# Patient Record
Sex: FEMALE | Race: WHITE | HISPANIC OR LATINO | Employment: FULL TIME | URBAN - METROPOLITAN AREA
[De-identification: names, ages, dates, MRNs, and addresses within clinical notes are randomized per-mention and may not be internally consistent; named-entity substitution may affect disease eponyms.]

---

## 2017-01-06 ENCOUNTER — APPOINTMENT (OUTPATIENT)
Dept: PHYSICAL THERAPY | Facility: REHABILITATION | Age: 37
End: 2017-01-06
Payer: COMMERCIAL

## 2017-01-06 PROCEDURE — 97110 THERAPEUTIC EXERCISES: CPT

## 2017-01-06 PROCEDURE — 97162 PT EVAL MOD COMPLEX 30 MIN: CPT

## 2017-01-09 ENCOUNTER — APPOINTMENT (OUTPATIENT)
Dept: PHYSICAL THERAPY | Facility: REHABILITATION | Age: 37
End: 2017-01-09
Payer: COMMERCIAL

## 2017-01-09 PROCEDURE — 97140 MANUAL THERAPY 1/> REGIONS: CPT

## 2017-01-09 PROCEDURE — 97110 THERAPEUTIC EXERCISES: CPT

## 2017-01-10 ENCOUNTER — APPOINTMENT (OUTPATIENT)
Dept: PHYSICAL THERAPY | Facility: REHABILITATION | Age: 37
End: 2017-01-10
Payer: COMMERCIAL

## 2017-01-10 PROCEDURE — 97140 MANUAL THERAPY 1/> REGIONS: CPT

## 2017-01-10 PROCEDURE — 97110 THERAPEUTIC EXERCISES: CPT

## 2017-01-10 PROCEDURE — 97112 NEUROMUSCULAR REEDUCATION: CPT

## 2017-01-13 ENCOUNTER — APPOINTMENT (OUTPATIENT)
Dept: PHYSICAL THERAPY | Facility: REHABILITATION | Age: 37
End: 2017-01-13
Payer: COMMERCIAL

## 2017-01-13 PROCEDURE — 97110 THERAPEUTIC EXERCISES: CPT

## 2017-01-13 PROCEDURE — 97112 NEUROMUSCULAR REEDUCATION: CPT

## 2017-01-13 PROCEDURE — 97140 MANUAL THERAPY 1/> REGIONS: CPT

## 2017-01-16 ENCOUNTER — APPOINTMENT (OUTPATIENT)
Dept: PHYSICAL THERAPY | Facility: REHABILITATION | Age: 37
End: 2017-01-16
Payer: COMMERCIAL

## 2017-01-17 ENCOUNTER — APPOINTMENT (OUTPATIENT)
Dept: PHYSICAL THERAPY | Facility: REHABILITATION | Age: 37
End: 2017-01-17
Payer: COMMERCIAL

## 2017-01-18 ENCOUNTER — APPOINTMENT (OUTPATIENT)
Dept: PHYSICAL THERAPY | Facility: REHABILITATION | Age: 37
End: 2017-01-18
Payer: COMMERCIAL

## 2017-01-19 ENCOUNTER — APPOINTMENT (OUTPATIENT)
Dept: PHYSICAL THERAPY | Facility: REHABILITATION | Age: 37
End: 2017-01-19
Payer: COMMERCIAL

## 2017-01-19 PROCEDURE — 97110 THERAPEUTIC EXERCISES: CPT

## 2017-01-19 PROCEDURE — 97112 NEUROMUSCULAR REEDUCATION: CPT

## 2017-01-19 PROCEDURE — 97140 MANUAL THERAPY 1/> REGIONS: CPT

## 2017-01-23 ENCOUNTER — APPOINTMENT (OUTPATIENT)
Dept: PHYSICAL THERAPY | Facility: REHABILITATION | Age: 37
End: 2017-01-23
Payer: COMMERCIAL

## 2017-01-23 PROCEDURE — 97110 THERAPEUTIC EXERCISES: CPT

## 2017-01-23 PROCEDURE — 97140 MANUAL THERAPY 1/> REGIONS: CPT

## 2017-01-23 PROCEDURE — 97112 NEUROMUSCULAR REEDUCATION: CPT

## 2017-01-24 ENCOUNTER — APPOINTMENT (OUTPATIENT)
Dept: PHYSICAL THERAPY | Facility: REHABILITATION | Age: 37
End: 2017-01-24
Payer: COMMERCIAL

## 2017-01-24 PROCEDURE — 97110 THERAPEUTIC EXERCISES: CPT

## 2017-01-24 PROCEDURE — 97140 MANUAL THERAPY 1/> REGIONS: CPT

## 2017-01-24 PROCEDURE — 97112 NEUROMUSCULAR REEDUCATION: CPT

## 2017-01-26 ENCOUNTER — APPOINTMENT (OUTPATIENT)
Dept: PHYSICAL THERAPY | Facility: REHABILITATION | Age: 37
End: 2017-01-26
Payer: COMMERCIAL

## 2017-01-30 ENCOUNTER — APPOINTMENT (OUTPATIENT)
Dept: PHYSICAL THERAPY | Facility: REHABILITATION | Age: 37
End: 2017-01-30
Payer: COMMERCIAL

## 2017-01-31 ENCOUNTER — APPOINTMENT (OUTPATIENT)
Dept: PHYSICAL THERAPY | Facility: REHABILITATION | Age: 37
End: 2017-01-31
Payer: COMMERCIAL

## 2017-02-02 ENCOUNTER — APPOINTMENT (OUTPATIENT)
Dept: PHYSICAL THERAPY | Facility: REHABILITATION | Age: 37
End: 2017-02-02
Payer: COMMERCIAL

## 2017-02-07 ENCOUNTER — APPOINTMENT (OUTPATIENT)
Dept: PHYSICAL THERAPY | Facility: REHABILITATION | Age: 37
End: 2017-02-07
Payer: COMMERCIAL

## 2017-02-07 ENCOUNTER — GENERIC CONVERSION - ENCOUNTER (OUTPATIENT)
Dept: OTHER | Facility: OTHER | Age: 37
End: 2017-02-07

## 2017-02-07 PROCEDURE — 97110 THERAPEUTIC EXERCISES: CPT

## 2017-02-07 PROCEDURE — 97140 MANUAL THERAPY 1/> REGIONS: CPT

## 2017-02-07 PROCEDURE — 97112 NEUROMUSCULAR REEDUCATION: CPT

## 2017-02-08 ENCOUNTER — APPOINTMENT (OUTPATIENT)
Dept: PHYSICAL THERAPY | Facility: REHABILITATION | Age: 37
End: 2017-02-08
Payer: COMMERCIAL

## 2017-02-08 PROCEDURE — 97110 THERAPEUTIC EXERCISES: CPT

## 2017-02-08 PROCEDURE — 97112 NEUROMUSCULAR REEDUCATION: CPT

## 2017-02-08 PROCEDURE — 97140 MANUAL THERAPY 1/> REGIONS: CPT

## 2017-02-14 ENCOUNTER — APPOINTMENT (OUTPATIENT)
Dept: PHYSICAL THERAPY | Facility: REHABILITATION | Age: 37
End: 2017-02-14
Payer: COMMERCIAL

## 2017-02-14 PROCEDURE — 97112 NEUROMUSCULAR REEDUCATION: CPT

## 2017-02-14 PROCEDURE — 97110 THERAPEUTIC EXERCISES: CPT

## 2017-02-14 PROCEDURE — 97140 MANUAL THERAPY 1/> REGIONS: CPT

## 2017-02-15 ENCOUNTER — APPOINTMENT (OUTPATIENT)
Dept: PHYSICAL THERAPY | Facility: REHABILITATION | Age: 37
End: 2017-02-15
Payer: COMMERCIAL

## 2017-02-15 PROCEDURE — 97110 THERAPEUTIC EXERCISES: CPT

## 2017-02-15 PROCEDURE — 97112 NEUROMUSCULAR REEDUCATION: CPT

## 2017-02-15 PROCEDURE — 97140 MANUAL THERAPY 1/> REGIONS: CPT

## 2017-02-21 ENCOUNTER — APPOINTMENT (OUTPATIENT)
Dept: PHYSICAL THERAPY | Facility: REHABILITATION | Age: 37
End: 2017-02-21
Payer: COMMERCIAL

## 2017-02-21 PROCEDURE — 97112 NEUROMUSCULAR REEDUCATION: CPT

## 2017-02-21 PROCEDURE — 97110 THERAPEUTIC EXERCISES: CPT

## 2017-02-21 PROCEDURE — 97140 MANUAL THERAPY 1/> REGIONS: CPT

## 2017-02-22 ENCOUNTER — APPOINTMENT (OUTPATIENT)
Dept: PHYSICAL THERAPY | Facility: REHABILITATION | Age: 37
End: 2017-02-22
Payer: COMMERCIAL

## 2017-02-22 PROCEDURE — 97112 NEUROMUSCULAR REEDUCATION: CPT

## 2017-02-22 PROCEDURE — 97110 THERAPEUTIC EXERCISES: CPT

## 2017-02-22 PROCEDURE — 97140 MANUAL THERAPY 1/> REGIONS: CPT

## 2017-02-24 ENCOUNTER — APPOINTMENT (OUTPATIENT)
Dept: PHYSICAL THERAPY | Facility: REHABILITATION | Age: 37
End: 2017-02-24
Payer: COMMERCIAL

## 2017-02-27 ENCOUNTER — APPOINTMENT (OUTPATIENT)
Dept: PHYSICAL THERAPY | Facility: REHABILITATION | Age: 37
End: 2017-02-27
Payer: COMMERCIAL

## 2017-02-27 PROCEDURE — 97112 NEUROMUSCULAR REEDUCATION: CPT

## 2017-02-27 PROCEDURE — 97110 THERAPEUTIC EXERCISES: CPT

## 2017-02-27 PROCEDURE — 97140 MANUAL THERAPY 1/> REGIONS: CPT

## 2017-02-28 ENCOUNTER — APPOINTMENT (OUTPATIENT)
Dept: PHYSICAL THERAPY | Facility: REHABILITATION | Age: 37
End: 2017-02-28
Payer: COMMERCIAL

## 2017-02-28 PROCEDURE — 97110 THERAPEUTIC EXERCISES: CPT

## 2017-02-28 PROCEDURE — 97112 NEUROMUSCULAR REEDUCATION: CPT

## 2017-04-19 ENCOUNTER — GENERIC CONVERSION - ENCOUNTER (OUTPATIENT)
Dept: OTHER | Facility: OTHER | Age: 37
End: 2017-04-19

## 2017-04-24 DIAGNOSIS — M54.50 LOW BACK PAIN: ICD-10-CM

## 2017-04-24 DIAGNOSIS — M25.559 PAIN IN HIP: ICD-10-CM

## 2017-05-01 ENCOUNTER — HOSPITAL ENCOUNTER (OUTPATIENT)
Dept: RADIOLOGY | Facility: HOSPITAL | Age: 37
Discharge: HOME | End: 2017-05-01
Attending: ORTHOPAEDIC SURGERY
Payer: COMMERCIAL

## 2017-05-01 DIAGNOSIS — M54.9 BACKACHE: ICD-10-CM

## 2017-05-01 PROCEDURE — 72148 MRI LUMBAR SPINE W/O DYE: CPT

## 2017-05-15 ENCOUNTER — HOSPITAL ENCOUNTER (OUTPATIENT)
Dept: RADIOLOGY | Facility: HOSPITAL | Age: 37
Discharge: HOME | End: 2017-05-15
Attending: ORTHOPAEDIC SURGERY
Payer: COMMERCIAL

## 2017-05-15 ENCOUNTER — ALLSCRIPTS OFFICE VISIT (OUTPATIENT)
Dept: OTHER | Facility: OTHER | Age: 37
End: 2017-05-15

## 2017-05-15 DIAGNOSIS — M25.559 PAIN IN HIP: ICD-10-CM

## 2017-05-15 PROCEDURE — 73502 X-RAY EXAM HIP UNI 2-3 VIEWS: CPT

## 2017-05-30 ENCOUNTER — ALLSCRIPTS OFFICE VISIT (OUTPATIENT)
Dept: OTHER | Facility: OTHER | Age: 37
End: 2017-05-30

## 2017-06-19 ENCOUNTER — GENERIC CONVERSION - ENCOUNTER (OUTPATIENT)
Dept: OTHER | Facility: OTHER | Age: 37
End: 2017-06-19

## 2017-06-19 ENCOUNTER — APPOINTMENT (OUTPATIENT)
Dept: LAB | Facility: HOSPITAL | Age: 37
End: 2017-06-19
Attending: ORTHOPAEDIC SURGERY
Payer: COMMERCIAL

## 2017-06-19 ENCOUNTER — TRANSCRIBE ORDERS (OUTPATIENT)
Dept: LAB | Facility: HOSPITAL | Age: 37
End: 2017-06-19

## 2017-06-19 DIAGNOSIS — M16.32 OSTEOARTHRITIS RESULTING FROM LEFT HIP DYSPLASIA: ICD-10-CM

## 2017-06-19 DIAGNOSIS — M16.32 OSTEOARTHRITIS RESULTING FROM LEFT HIP DYSPLASIA: Primary | ICD-10-CM

## 2017-06-19 LAB
ABO GROUP BLD: NORMAL
ANION GAP SERPL CALCULATED.3IONS-SCNC: 7 MMOL/L (ref 4–13)
APTT PPP: 27 SECONDS (ref 23–35)
BASOPHILS # BLD AUTO: 0.03 THOUSANDS/ΜL (ref 0–0.1)
BASOPHILS NFR BLD AUTO: 1 % (ref 0–1)
BILIRUB UR QL STRIP: NEGATIVE
BLD GP AB SCN SERPL QL: NEGATIVE
BUN SERPL-MCNC: 13 MG/DL (ref 5–25)
CALCIUM SERPL-MCNC: 8.7 MG/DL (ref 8.3–10.1)
CHLORIDE SERPL-SCNC: 108 MMOL/L (ref 100–108)
CLARITY UR: CLEAR
CO2 SERPL-SCNC: 26 MMOL/L (ref 21–32)
COLOR UR: YELLOW
CREAT SERPL-MCNC: 0.55 MG/DL (ref 0.6–1.3)
EOSINOPHIL # BLD AUTO: 0.12 THOUSAND/ΜL (ref 0–0.61)
EOSINOPHIL NFR BLD AUTO: 2 % (ref 0–6)
ERYTHROCYTE [DISTWIDTH] IN BLOOD BY AUTOMATED COUNT: 13 % (ref 11.6–15.1)
EST. AVERAGE GLUCOSE BLD GHB EST-MCNC: 114 MG/DL
GFR SERPL CREATININE-BSD FRML MDRD: >60 ML/MIN/1.73SQ M
GLUCOSE P FAST SERPL-MCNC: 99 MG/DL (ref 65–99)
GLUCOSE UR STRIP-MCNC: NEGATIVE MG/DL
HBA1C MFR BLD: 5.6 % (ref 4.2–6.3)
HCT VFR BLD AUTO: 37.3 % (ref 34.8–46.1)
HGB BLD-MCNC: 12.5 G/DL (ref 11.5–15.4)
HGB UR QL STRIP.AUTO: NEGATIVE
INR PPP: 0.95 (ref 0.86–1.16)
KETONES UR STRIP-MCNC: NEGATIVE MG/DL
LEUKOCYTE ESTERASE UR QL STRIP: NEGATIVE
LYMPHOCYTES # BLD AUTO: 1.72 THOUSANDS/ΜL (ref 0.6–4.47)
LYMPHOCYTES NFR BLD AUTO: 31 % (ref 14–44)
MCH RBC QN AUTO: 29.8 PG (ref 26.8–34.3)
MCHC RBC AUTO-ENTMCNC: 33.5 G/DL (ref 31.4–37.4)
MCV RBC AUTO: 89 FL (ref 82–98)
MONOCYTES # BLD AUTO: 0.35 THOUSAND/ΜL (ref 0.17–1.22)
MONOCYTES NFR BLD AUTO: 6 % (ref 4–12)
NEUTROPHILS # BLD AUTO: 3.4 THOUSANDS/ΜL (ref 1.85–7.62)
NEUTS SEG NFR BLD AUTO: 60 % (ref 43–75)
NITRITE UR QL STRIP: NEGATIVE
NRBC BLD AUTO-RTO: 0 /100 WBCS
PH UR STRIP.AUTO: 6 [PH] (ref 4.5–8)
PLATELET # BLD AUTO: 268 THOUSANDS/UL (ref 149–390)
PMV BLD AUTO: 9.5 FL (ref 8.9–12.7)
POTASSIUM SERPL-SCNC: 4.2 MMOL/L (ref 3.5–5.3)
PROT UR STRIP-MCNC: NEGATIVE MG/DL
PROTHROMBIN TIME: 12.7 SECONDS (ref 12.1–14.4)
RBC # BLD AUTO: 4.19 MILLION/UL (ref 3.81–5.12)
RH BLD: NEGATIVE
SODIUM SERPL-SCNC: 141 MMOL/L (ref 136–145)
SP GR UR STRIP.AUTO: 1.03 (ref 1–1.03)
SPECIMEN EXPIRATION DATE: NORMAL
UROBILINOGEN UR QL STRIP.AUTO: 0.2 E.U./DL
WBC # BLD AUTO: 5.64 THOUSAND/UL (ref 4.31–10.16)

## 2017-06-19 PROCEDURE — 86850 RBC ANTIBODY SCREEN: CPT

## 2017-06-19 PROCEDURE — 86901 BLOOD TYPING SEROLOGIC RH(D): CPT

## 2017-06-19 PROCEDURE — 80048 BASIC METABOLIC PNL TOTAL CA: CPT

## 2017-06-19 PROCEDURE — 83036 HEMOGLOBIN GLYCOSYLATED A1C: CPT

## 2017-06-19 PROCEDURE — 85610 PROTHROMBIN TIME: CPT

## 2017-06-19 PROCEDURE — 85025 COMPLETE CBC W/AUTO DIFF WBC: CPT

## 2017-06-19 PROCEDURE — 36415 COLL VENOUS BLD VENIPUNCTURE: CPT

## 2017-06-19 PROCEDURE — 85730 THROMBOPLASTIN TIME PARTIAL: CPT

## 2017-06-19 PROCEDURE — 81003 URINALYSIS AUTO W/O SCOPE: CPT

## 2017-06-19 PROCEDURE — 86900 BLOOD TYPING SEROLOGIC ABO: CPT

## 2017-06-29 ENCOUNTER — APPOINTMENT (OUTPATIENT)
Dept: PREADMISSION TESTING | Facility: HOSPITAL | Age: 37
DRG: 470 | End: 2017-06-29
Payer: COMMERCIAL

## 2017-06-29 ENCOUNTER — HOSPITAL ENCOUNTER (OUTPATIENT)
Dept: RADIOLOGY | Facility: HOSPITAL | Age: 37
Discharge: HOME | End: 2017-06-29
Attending: ORTHOPAEDIC SURGERY
Payer: COMMERCIAL

## 2017-06-29 DIAGNOSIS — M16.32 OSTEOARTHRITIS RESULTING FROM LEFT HIP DYSPLASIA: ICD-10-CM

## 2017-06-29 PROCEDURE — 71020 HB CHEST X-RAY 2VW FRONTAL&LATL: CPT

## 2017-06-29 RX ORDER — ASCORBIC ACID 500 MG
500 TABLET ORAL
COMMUNITY

## 2017-06-29 RX ORDER — FOLIC ACID 1 MG/1
TABLET ORAL
COMMUNITY

## 2017-06-29 RX ORDER — FERROUS SULFATE 325(65) MG
325 TABLET ORAL
COMMUNITY

## 2017-07-05 ENCOUNTER — ANESTHESIA EVENT (OUTPATIENT)
Dept: PERIOP | Facility: HOSPITAL | Age: 37
DRG: 470 | End: 2017-07-05
Payer: COMMERCIAL

## 2017-07-10 ENCOUNTER — ANESTHESIA (OUTPATIENT)
Dept: PERIOP | Facility: HOSPITAL | Age: 37
DRG: 470 | End: 2017-07-10
Payer: COMMERCIAL

## 2017-07-10 ENCOUNTER — HOSPITAL ENCOUNTER (INPATIENT)
Facility: HOSPITAL | Age: 37
LOS: 2 days | Discharge: HOME HEALTH | DRG: 470 | End: 2017-07-12
Attending: ORTHOPAEDIC SURGERY | Admitting: ORTHOPAEDIC SURGERY
Payer: COMMERCIAL

## 2017-07-10 ENCOUNTER — APPOINTMENT (OUTPATIENT)
Dept: RADIOLOGY | Facility: HOSPITAL | Age: 37
DRG: 470 | End: 2017-07-10
Payer: COMMERCIAL

## 2017-07-10 DIAGNOSIS — Z96.642 STATUS POST TOTAL HIP REPLACEMENT, LEFT: Primary | ICD-10-CM

## 2017-07-10 PROBLEM — J45.909 ASTHMA: Status: ACTIVE | Noted: 2017-07-10

## 2017-07-10 LAB
ABO GROUP BLD: NORMAL
BLD GP AB SCN SERPL QL: NEGATIVE
EXT PREGNANCY TEST URINE: NEGATIVE
GLUCOSE SERPL-MCNC: 140 MG/DL (ref 65–140)
RH BLD: NEGATIVE
SPECIMEN EXPIRATION DATE: NORMAL

## 2017-07-10 PROCEDURE — 86850 RBC ANTIBODY SCREEN: CPT | Performed by: ORTHOPAEDIC SURGERY

## 2017-07-10 PROCEDURE — 97163 PT EVAL HIGH COMPLEX 45 MIN: CPT

## 2017-07-10 PROCEDURE — C1776 JOINT DEVICE (IMPLANTABLE): HCPCS | Performed by: ORTHOPAEDIC SURGERY

## 2017-07-10 PROCEDURE — 0SRB02Z REPLACEMENT OF LEFT HIP JOINT WITH METAL ON POLYETHYLENE SYNTHETIC SUBSTITUTE, OPEN APPROACH: ICD-10-PCS | Performed by: ORTHOPAEDIC SURGERY

## 2017-07-10 PROCEDURE — 86901 BLOOD TYPING SEROLOGIC RH(D): CPT | Performed by: ORTHOPAEDIC SURGERY

## 2017-07-10 PROCEDURE — 86900 BLOOD TYPING SEROLOGIC ABO: CPT | Performed by: ORTHOPAEDIC SURGERY

## 2017-07-10 PROCEDURE — G8978 MOBILITY CURRENT STATUS: HCPCS

## 2017-07-10 PROCEDURE — G8979 MOBILITY GOAL STATUS: HCPCS

## 2017-07-10 PROCEDURE — 81025 URINE PREGNANCY TEST: CPT | Performed by: ANESTHESIOLOGY

## 2017-07-10 PROCEDURE — 82948 REAGENT STRIP/BLOOD GLUCOSE: CPT

## 2017-07-10 PROCEDURE — 97166 OT EVAL MOD COMPLEX 45 MIN: CPT

## 2017-07-10 PROCEDURE — 73501 X-RAY EXAM HIP UNI 1 VIEW: CPT

## 2017-07-10 PROCEDURE — G8988 SELF CARE GOAL STATUS: HCPCS

## 2017-07-10 PROCEDURE — C1713 ANCHOR/SCREW BN/BN,TIS/BN: HCPCS | Performed by: ORTHOPAEDIC SURGERY

## 2017-07-10 PROCEDURE — G8987 SELF CARE CURRENT STATUS: HCPCS

## 2017-07-10 DEVICE — BIOLOX DELTA CERAMIC FEMORAL HEAD 32MM DIA +1 12/14 TAPER
Type: IMPLANTABLE DEVICE | Site: HIP | Status: FUNCTIONAL
Brand: BIOLOX DELTA

## 2017-07-10 DEVICE — PINNACLE POROCOAT ACETABULAR SHELL SECTOR II 48MM OD
Type: IMPLANTABLE DEVICE | Site: HIP | Status: FUNCTIONAL
Brand: PINNACLE POROCOAT

## 2017-07-10 DEVICE — CORAIL HIP SYSTEM CEMENTLESS FEMORAL STEM 12/14 AMT 135 DEGREES KA SIZE 12 HA COATED STANDARD COLLAR
Type: IMPLANTABLE DEVICE | Site: HIP | Status: FUNCTIONAL
Brand: CORAIL

## 2017-07-10 DEVICE — PINNACLE HIP SOLUTIONS ALTRX POLYETHYLENE ACETABULAR LINER NEUTRAL 32MM ID 48MM OD
Type: IMPLANTABLE DEVICE | Site: HIP | Status: FUNCTIONAL
Brand: PINNACLE ALTRX

## 2017-07-10 DEVICE — PINNACLE CANCELLOUS BONE SCREW 6.5MM X 25MM
Type: IMPLANTABLE DEVICE | Site: HIP | Status: FUNCTIONAL
Brand: PINNACLE

## 2017-07-10 RX ORDER — MAGNESIUM HYDROXIDE 1200 MG/15ML
LIQUID ORAL AS NEEDED
Status: DISCONTINUED | OUTPATIENT
Start: 2017-07-10 | End: 2017-07-10 | Stop reason: HOSPADM

## 2017-07-10 RX ORDER — MORPHINE SULFATE 2 MG/ML
2 INJECTION, SOLUTION INTRAMUSCULAR; INTRAVENOUS
Status: DISCONTINUED | OUTPATIENT
Start: 2017-07-10 | End: 2017-07-12 | Stop reason: HOSPADM

## 2017-07-10 RX ORDER — GLYCOPYRROLATE 0.2 MG/ML
INJECTION INTRAMUSCULAR; INTRAVENOUS AS NEEDED
Status: DISCONTINUED | OUTPATIENT
Start: 2017-07-10 | End: 2017-07-10 | Stop reason: SURG

## 2017-07-10 RX ORDER — ONDANSETRON 2 MG/ML
INJECTION INTRAMUSCULAR; INTRAVENOUS AS NEEDED
Status: DISCONTINUED | OUTPATIENT
Start: 2017-07-10 | End: 2017-07-10 | Stop reason: SURG

## 2017-07-10 RX ORDER — SODIUM CHLORIDE, SODIUM LACTATE, POTASSIUM CHLORIDE, CALCIUM CHLORIDE 600; 310; 30; 20 MG/100ML; MG/100ML; MG/100ML; MG/100ML
100 INJECTION, SOLUTION INTRAVENOUS CONTINUOUS
Status: DISPENSED | OUTPATIENT
Start: 2017-07-10 | End: 2017-07-11

## 2017-07-10 RX ORDER — OXYCODONE HYDROCHLORIDE 5 MG/1
TABLET ORAL
Qty: 30 TABLET | Refills: 0 | Status: SHIPPED | OUTPATIENT
Start: 2017-07-10

## 2017-07-10 RX ORDER — FENTANYL CITRATE/PF 50 MCG/ML
50 SYRINGE (ML) INJECTION
Status: DISCONTINUED | OUTPATIENT
Start: 2017-07-10 | End: 2017-07-10 | Stop reason: HOSPADM

## 2017-07-10 RX ORDER — ROCURONIUM BROMIDE 10 MG/ML
INJECTION, SOLUTION INTRAVENOUS AS NEEDED
Status: DISCONTINUED | OUTPATIENT
Start: 2017-07-10 | End: 2017-07-10 | Stop reason: SURG

## 2017-07-10 RX ORDER — METOCLOPRAMIDE HYDROCHLORIDE 5 MG/ML
10 INJECTION INTRAMUSCULAR; INTRAVENOUS ONCE AS NEEDED
Status: DISCONTINUED | OUTPATIENT
Start: 2017-07-10 | End: 2017-07-10 | Stop reason: HOSPADM

## 2017-07-10 RX ORDER — PROPOFOL 10 MG/ML
INJECTION, EMULSION INTRAVENOUS AS NEEDED
Status: DISCONTINUED | OUTPATIENT
Start: 2017-07-10 | End: 2017-07-10 | Stop reason: SURG

## 2017-07-10 RX ORDER — ACETAMINOPHEN 325 MG/1
975 TABLET ORAL ONCE
Status: DISCONTINUED | OUTPATIENT
Start: 2017-07-10 | End: 2017-07-10

## 2017-07-10 RX ORDER — GABAPENTIN 300 MG/1
300 CAPSULE ORAL ONCE
Status: DISCONTINUED | OUTPATIENT
Start: 2017-07-10 | End: 2017-07-10

## 2017-07-10 RX ORDER — CALCIUM CARBONATE 200(500)MG
1000 TABLET,CHEWABLE ORAL DAILY PRN
Status: DISCONTINUED | OUTPATIENT
Start: 2017-07-10 | End: 2017-07-12 | Stop reason: HOSPADM

## 2017-07-10 RX ORDER — ASCORBIC ACID 500 MG
500 TABLET ORAL DAILY
Status: DISCONTINUED | OUTPATIENT
Start: 2017-07-10 | End: 2017-07-12 | Stop reason: HOSPADM

## 2017-07-10 RX ORDER — SENNOSIDES 8.6 MG
650 CAPSULE ORAL EVERY 8 HOURS PRN
Qty: 30 TABLET | Refills: 0 | Status: SHIPPED | OUTPATIENT
Start: 2017-07-10 | End: 2017-08-09

## 2017-07-10 RX ORDER — OXYCODONE HYDROCHLORIDE 10 MG/1
10 TABLET ORAL EVERY 4 HOURS PRN
Status: DISCONTINUED | OUTPATIENT
Start: 2017-07-10 | End: 2017-07-12 | Stop reason: HOSPADM

## 2017-07-10 RX ORDER — LIDOCAINE HYDROCHLORIDE AND EPINEPHRINE 10; 10 MG/ML; UG/ML
INJECTION, SOLUTION INFILTRATION; PERINEURAL AS NEEDED
Status: DISCONTINUED | OUTPATIENT
Start: 2017-07-10 | End: 2017-07-10 | Stop reason: HOSPADM

## 2017-07-10 RX ORDER — ACETAMINOPHEN 325 MG/1
650 TABLET ORAL EVERY 6 HOURS PRN
Status: DISCONTINUED | OUTPATIENT
Start: 2017-07-10 | End: 2017-07-12 | Stop reason: HOSPADM

## 2017-07-10 RX ORDER — TRAMADOL HYDROCHLORIDE 50 MG/1
50 TABLET ORAL EVERY 6 HOURS SCHEDULED
Status: DISCONTINUED | OUTPATIENT
Start: 2017-07-10 | End: 2017-07-12 | Stop reason: HOSPADM

## 2017-07-10 RX ORDER — SODIUM CHLORIDE, SODIUM LACTATE, POTASSIUM CHLORIDE, CALCIUM CHLORIDE 600; 310; 30; 20 MG/100ML; MG/100ML; MG/100ML; MG/100ML
125 INJECTION, SOLUTION INTRAVENOUS CONTINUOUS
Status: DISCONTINUED | OUTPATIENT
Start: 2017-07-10 | End: 2017-07-10

## 2017-07-10 RX ORDER — TRAMADOL HYDROCHLORIDE 50 MG/1
50 TABLET ORAL EVERY 6 HOURS PRN
Qty: 30 TABLET | Refills: 0 | Status: SHIPPED | OUTPATIENT
Start: 2017-07-10 | End: 2017-07-20

## 2017-07-10 RX ORDER — MEPERIDINE HYDROCHLORIDE 25 MG/ML
12.5 INJECTION INTRAMUSCULAR; INTRAVENOUS; SUBCUTANEOUS ONCE AS NEEDED
Status: DISCONTINUED | OUTPATIENT
Start: 2017-07-10 | End: 2017-07-10 | Stop reason: HOSPADM

## 2017-07-10 RX ORDER — ONDANSETRON 2 MG/ML
4 INJECTION INTRAMUSCULAR; INTRAVENOUS EVERY 6 HOURS PRN
Status: DISCONTINUED | OUTPATIENT
Start: 2017-07-10 | End: 2017-07-12 | Stop reason: HOSPADM

## 2017-07-10 RX ORDER — FERROUS SULFATE 325(65) MG
325 TABLET ORAL 2 TIMES DAILY WITH MEALS
Status: DISCONTINUED | OUTPATIENT
Start: 2017-07-10 | End: 2017-07-12 | Stop reason: HOSPADM

## 2017-07-10 RX ORDER — ALBUTEROL SULFATE 90 UG/1
2 AEROSOL, METERED RESPIRATORY (INHALATION) EVERY 6 HOURS PRN
Status: DISCONTINUED | OUTPATIENT
Start: 2017-07-10 | End: 2017-07-12 | Stop reason: HOSPADM

## 2017-07-10 RX ORDER — LIDOCAINE HYDROCHLORIDE 10 MG/ML
INJECTION, SOLUTION EPIDURAL; INFILTRATION; INTRACAUDAL; PERINEURAL AS NEEDED
Status: DISCONTINUED | OUTPATIENT
Start: 2017-07-10 | End: 2017-07-10 | Stop reason: SURG

## 2017-07-10 RX ORDER — FENTANYL CITRATE 50 UG/ML
INJECTION, SOLUTION INTRAMUSCULAR; INTRAVENOUS AS NEEDED
Status: DISCONTINUED | OUTPATIENT
Start: 2017-07-10 | End: 2017-07-10 | Stop reason: SURG

## 2017-07-10 RX ORDER — OXYCODONE HYDROCHLORIDE 5 MG/1
5 TABLET ORAL EVERY 4 HOURS PRN
Status: DISCONTINUED | OUTPATIENT
Start: 2017-07-10 | End: 2017-07-12 | Stop reason: HOSPADM

## 2017-07-10 RX ORDER — MAGNESIUM HYDROXIDE/ALUMINUM HYDROXICE/SIMETHICONE 120; 1200; 1200 MG/30ML; MG/30ML; MG/30ML
30 SUSPENSION ORAL EVERY 6 HOURS PRN
Status: DISCONTINUED | OUTPATIENT
Start: 2017-07-10 | End: 2017-07-12 | Stop reason: HOSPADM

## 2017-07-10 RX ORDER — SENNOSIDES 8.6 MG
1 TABLET ORAL DAILY
Status: DISCONTINUED | OUTPATIENT
Start: 2017-07-10 | End: 2017-07-12 | Stop reason: HOSPADM

## 2017-07-10 RX ORDER — DOCUSATE SODIUM 100 MG/1
100 CAPSULE, LIQUID FILLED ORAL 2 TIMES DAILY
Status: DISCONTINUED | OUTPATIENT
Start: 2017-07-10 | End: 2017-07-12 | Stop reason: HOSPADM

## 2017-07-10 RX ORDER — MIDAZOLAM HYDROCHLORIDE 1 MG/ML
INJECTION INTRAMUSCULAR; INTRAVENOUS AS NEEDED
Status: DISCONTINUED | OUTPATIENT
Start: 2017-07-10 | End: 2017-07-10 | Stop reason: SURG

## 2017-07-10 RX ORDER — ONDANSETRON 2 MG/ML
4 INJECTION INTRAMUSCULAR; INTRAVENOUS ONCE AS NEEDED
Status: DISCONTINUED | OUTPATIENT
Start: 2017-07-10 | End: 2017-07-10 | Stop reason: HOSPADM

## 2017-07-10 RX ORDER — ACETAMINOPHEN 325 MG/1
975 TABLET ORAL ONCE
Status: COMPLETED | OUTPATIENT
Start: 2017-07-10 | End: 2017-07-10

## 2017-07-10 RX ADMIN — MORPHINE SULFATE 2 MG: 2 INJECTION, SOLUTION INTRAMUSCULAR; INTRAVENOUS at 15:10

## 2017-07-10 RX ADMIN — NEOSTIGMINE METHYLSULFATE 3 MG: 1 INJECTION, SOLUTION INTRAMUSCULAR; INTRAVENOUS; SUBCUTANEOUS at 12:40

## 2017-07-10 RX ADMIN — HYDROMORPHONE HYDROCHLORIDE 0.2 MG: 1 INJECTION, SOLUTION INTRAMUSCULAR; INTRAVENOUS; SUBCUTANEOUS at 13:24

## 2017-07-10 RX ADMIN — CEFAZOLIN SODIUM 2000 MG: 2 SOLUTION INTRAVENOUS at 18:17

## 2017-07-10 RX ADMIN — DOCUSATE SODIUM 100 MG: 100 CAPSULE, LIQUID FILLED ORAL at 18:14

## 2017-07-10 RX ADMIN — ROCURONIUM BROMIDE 50 MG: 10 INJECTION, SOLUTION INTRAVENOUS at 10:39

## 2017-07-10 RX ADMIN — GLYCOPYRROLATE 0.1 MG: 0.2 INJECTION, SOLUTION INTRAMUSCULAR; INTRAVENOUS at 10:35

## 2017-07-10 RX ADMIN — HYDROMORPHONE HYDROCHLORIDE 0.5 MG: 1 INJECTION, SOLUTION INTRAMUSCULAR; INTRAVENOUS; SUBCUTANEOUS at 12:04

## 2017-07-10 RX ADMIN — HYDROMORPHONE HYDROCHLORIDE 0.2 MG: 1 INJECTION, SOLUTION INTRAMUSCULAR; INTRAVENOUS; SUBCUTANEOUS at 13:49

## 2017-07-10 RX ADMIN — SENNOSIDES 8.6 MG: 8.6 TABLET, FILM COATED ORAL at 16:18

## 2017-07-10 RX ADMIN — ROCURONIUM BROMIDE 20 MG: 10 INJECTION, SOLUTION INTRAVENOUS at 11:15

## 2017-07-10 RX ADMIN — ONDANSETRON 4 MG: 2 INJECTION INTRAMUSCULAR; INTRAVENOUS at 12:34

## 2017-07-10 RX ADMIN — ROCURONIUM BROMIDE 10 MG: 10 INJECTION, SOLUTION INTRAVENOUS at 12:16

## 2017-07-10 RX ADMIN — HYDROMORPHONE HYDROCHLORIDE 0.5 MG: 1 INJECTION, SOLUTION INTRAMUSCULAR; INTRAVENOUS; SUBCUTANEOUS at 11:13

## 2017-07-10 RX ADMIN — CEFAZOLIN SODIUM 2000 MG: 2 SOLUTION INTRAVENOUS at 10:44

## 2017-07-10 RX ADMIN — DEXAMETHASONE SODIUM PHOSPHATE 5 MG: 10 INJECTION INTRAMUSCULAR; INTRAVENOUS at 10:51

## 2017-07-10 RX ADMIN — TRANEXAMIC ACID 1500 MG: 100 INJECTION, SOLUTION INTRAVENOUS at 10:49

## 2017-07-10 RX ADMIN — ACETAMINOPHEN 975 MG: 325 TABLET, FILM COATED ORAL at 09:21

## 2017-07-10 RX ADMIN — HYDROMORPHONE HYDROCHLORIDE 0.5 MG: 1 INJECTION, SOLUTION INTRAMUSCULAR; INTRAVENOUS; SUBCUTANEOUS at 11:48

## 2017-07-10 RX ADMIN — LIDOCAINE HYDROCHLORIDE 20 MG: 10 INJECTION, SOLUTION EPIDURAL; INFILTRATION; INTRACAUDAL; PERINEURAL at 10:39

## 2017-07-10 RX ADMIN — HYDROMORPHONE HYDROCHLORIDE 0.2 MG: 1 INJECTION, SOLUTION INTRAMUSCULAR; INTRAVENOUS; SUBCUTANEOUS at 13:32

## 2017-07-10 RX ADMIN — Medication 500 MG: at 16:18

## 2017-07-10 RX ADMIN — OXYCODONE HYDROCHLORIDE 10 MG: 10 TABLET ORAL at 16:18

## 2017-07-10 RX ADMIN — HYDROMORPHONE HYDROCHLORIDE 0.5 MG: 1 INJECTION, SOLUTION INTRAMUSCULAR; INTRAVENOUS; SUBCUTANEOUS at 12:43

## 2017-07-10 RX ADMIN — FENTANYL CITRATE 50 MCG: 50 INJECTION, SOLUTION INTRAMUSCULAR; INTRAVENOUS at 10:39

## 2017-07-10 RX ADMIN — TRAMADOL HYDROCHLORIDE 50 MG: 50 TABLET, COATED ORAL at 23:37

## 2017-07-10 RX ADMIN — SODIUM CHLORIDE, SODIUM LACTATE, POTASSIUM CHLORIDE, AND CALCIUM CHLORIDE 100 ML/HR: .6; .31; .03; .02 INJECTION, SOLUTION INTRAVENOUS at 17:45

## 2017-07-10 RX ADMIN — SODIUM CHLORIDE, SODIUM LACTATE, POTASSIUM CHLORIDE, AND CALCIUM CHLORIDE 100 ML/HR: .6; .31; .03; .02 INJECTION, SOLUTION INTRAVENOUS at 15:09

## 2017-07-10 RX ADMIN — SODIUM CHLORIDE, SODIUM LACTATE, POTASSIUM CHLORIDE, AND CALCIUM CHLORIDE: .6; .31; .03; .02 INJECTION, SOLUTION INTRAVENOUS at 12:05

## 2017-07-10 RX ADMIN — ROCURONIUM BROMIDE 20 MG: 10 INJECTION, SOLUTION INTRAVENOUS at 11:44

## 2017-07-10 RX ADMIN — PROPOFOL 200 MG: 10 INJECTION, EMULSION INTRAVENOUS at 10:39

## 2017-07-10 RX ADMIN — MIDAZOLAM HYDROCHLORIDE 2 MG: 1 INJECTION, SOLUTION INTRAMUSCULAR; INTRAVENOUS at 10:35

## 2017-07-10 RX ADMIN — SODIUM CHLORIDE, SODIUM LACTATE, POTASSIUM CHLORIDE, AND CALCIUM CHLORIDE 125 ML/HR: .6; .31; .03; .02 INJECTION, SOLUTION INTRAVENOUS at 10:06

## 2017-07-10 RX ADMIN — OXYCODONE HYDROCHLORIDE 10 MG: 10 TABLET ORAL at 21:27

## 2017-07-10 RX ADMIN — FENTANYL CITRATE 50 MCG: 50 INJECTION, SOLUTION INTRAMUSCULAR; INTRAVENOUS at 11:13

## 2017-07-10 RX ADMIN — GLYCOPYRROLATE 0.4 MG: 0.2 INJECTION, SOLUTION INTRAMUSCULAR; INTRAVENOUS at 12:40

## 2017-07-10 RX ADMIN — ONDANSETRON 4 MG: 2 INJECTION INTRAMUSCULAR; INTRAVENOUS at 16:19

## 2017-07-10 RX ADMIN — TRAMADOL HYDROCHLORIDE 50 MG: 50 TABLET, COATED ORAL at 18:14

## 2017-07-10 RX ADMIN — Medication 325 MG: at 16:19

## 2017-07-10 RX ADMIN — SODIUM CHLORIDE, SODIUM LACTATE, POTASSIUM CHLORIDE, AND CALCIUM CHLORIDE: .6; .31; .03; .02 INJECTION, SOLUTION INTRAVENOUS at 12:00

## 2017-07-11 ENCOUNTER — APPOINTMENT (INPATIENT)
Dept: OCCUPATIONAL THERAPY | Facility: HOSPITAL | Age: 37
DRG: 470 | End: 2017-07-11
Payer: COMMERCIAL

## 2017-07-11 LAB
ERYTHROCYTE [DISTWIDTH] IN BLOOD BY AUTOMATED COUNT: 12.9 % (ref 11.6–15.1)
HCT VFR BLD AUTO: 31.9 % (ref 34.8–46.1)
HGB BLD-MCNC: 10.7 G/DL (ref 11.5–15.4)
MCH RBC QN AUTO: 29.7 PG (ref 26.8–34.3)
MCHC RBC AUTO-ENTMCNC: 33.5 G/DL (ref 31.4–37.4)
MCV RBC AUTO: 89 FL (ref 82–98)
PLATELET # BLD AUTO: 238 THOUSANDS/UL (ref 149–390)
PMV BLD AUTO: 9.6 FL (ref 8.9–12.7)
RBC # BLD AUTO: 3.6 MILLION/UL (ref 3.81–5.12)
WBC # BLD AUTO: 9.96 THOUSAND/UL (ref 4.31–10.16)

## 2017-07-11 PROCEDURE — 97535 SELF CARE MNGMENT TRAINING: CPT

## 2017-07-11 PROCEDURE — G8989 SELF CARE D/C STATUS: HCPCS

## 2017-07-11 PROCEDURE — 97110 THERAPEUTIC EXERCISES: CPT

## 2017-07-11 PROCEDURE — 97116 GAIT TRAINING THERAPY: CPT

## 2017-07-11 PROCEDURE — 85027 COMPLETE CBC AUTOMATED: CPT | Performed by: PHYSICIAN ASSISTANT

## 2017-07-11 RX ORDER — ONDANSETRON 2 MG/ML
4 INJECTION INTRAMUSCULAR; INTRAVENOUS EVERY 6 HOURS PRN
Refills: 0
Start: 2017-07-11

## 2017-07-11 RX ADMIN — OXYCODONE HYDROCHLORIDE 10 MG: 10 TABLET ORAL at 17:27

## 2017-07-11 RX ADMIN — ENOXAPARIN SODIUM 40 MG: 40 INJECTION SUBCUTANEOUS at 08:47

## 2017-07-11 RX ADMIN — OXYCODONE HYDROCHLORIDE 10 MG: 10 TABLET ORAL at 02:35

## 2017-07-11 RX ADMIN — ONDANSETRON 4 MG: 2 INJECTION INTRAMUSCULAR; INTRAVENOUS at 02:35

## 2017-07-11 RX ADMIN — Medication 325 MG: at 17:24

## 2017-07-11 RX ADMIN — SENNOSIDES 8.6 MG: 8.6 TABLET, FILM COATED ORAL at 08:46

## 2017-07-11 RX ADMIN — DOCUSATE SODIUM 100 MG: 100 CAPSULE, LIQUID FILLED ORAL at 08:47

## 2017-07-11 RX ADMIN — TRAMADOL HYDROCHLORIDE 50 MG: 50 TABLET, COATED ORAL at 17:24

## 2017-07-11 RX ADMIN — Medication 325 MG: at 08:46

## 2017-07-11 RX ADMIN — CEFAZOLIN SODIUM 2000 MG: 2 SOLUTION INTRAVENOUS at 02:35

## 2017-07-11 RX ADMIN — TRAMADOL HYDROCHLORIDE 50 MG: 50 TABLET, COATED ORAL at 23:14

## 2017-07-11 RX ADMIN — Medication 500 MG: at 08:45

## 2017-07-11 RX ADMIN — TRAMADOL HYDROCHLORIDE 50 MG: 50 TABLET, COATED ORAL at 05:24

## 2017-07-11 RX ADMIN — TRAMADOL HYDROCHLORIDE 50 MG: 50 TABLET, COATED ORAL at 11:33

## 2017-07-11 RX ADMIN — OXYCODONE HYDROCHLORIDE 10 MG: 10 TABLET ORAL at 08:46

## 2017-07-11 RX ADMIN — ALUMINUM HYDROXIDE, MAGNESIUM HYDROXIDE, AND SIMETHICONE 30 ML: 200; 200; 20 SUSPENSION ORAL at 22:47

## 2017-07-11 RX ADMIN — DOCUSATE SODIUM 100 MG: 100 CAPSULE, LIQUID FILLED ORAL at 17:24

## 2017-07-12 VITALS
WEIGHT: 208.11 LBS | OXYGEN SATURATION: 95 % | TEMPERATURE: 99 F | DIASTOLIC BLOOD PRESSURE: 61 MMHG | BODY MASS INDEX: 33.45 KG/M2 | SYSTOLIC BLOOD PRESSURE: 118 MMHG | RESPIRATION RATE: 18 BRPM | HEART RATE: 104 BPM | HEIGHT: 66 IN

## 2017-07-12 LAB
ERYTHROCYTE [DISTWIDTH] IN BLOOD BY AUTOMATED COUNT: 12.9 % (ref 11.6–15.1)
HCT VFR BLD AUTO: 29.7 % (ref 34.8–46.1)
HGB BLD-MCNC: 10.1 G/DL (ref 11.5–15.4)
MCH RBC QN AUTO: 30.4 PG (ref 26.8–34.3)
MCHC RBC AUTO-ENTMCNC: 34 G/DL (ref 31.4–37.4)
MCV RBC AUTO: 90 FL (ref 82–98)
PLATELET # BLD AUTO: 194 THOUSANDS/UL (ref 149–390)
PMV BLD AUTO: 9.7 FL (ref 8.9–12.7)
RBC # BLD AUTO: 3.32 MILLION/UL (ref 3.81–5.12)
WBC # BLD AUTO: 8.48 THOUSAND/UL (ref 4.31–10.16)

## 2017-07-12 PROCEDURE — 85027 COMPLETE CBC AUTOMATED: CPT | Performed by: PHYSICIAN ASSISTANT

## 2017-07-12 PROCEDURE — 97116 GAIT TRAINING THERAPY: CPT

## 2017-07-12 PROCEDURE — 97112 NEUROMUSCULAR REEDUCATION: CPT

## 2017-07-12 PROCEDURE — 97110 THERAPEUTIC EXERCISES: CPT

## 2017-07-12 RX ADMIN — DOCUSATE SODIUM 100 MG: 100 CAPSULE, LIQUID FILLED ORAL at 08:18

## 2017-07-12 RX ADMIN — ENOXAPARIN SODIUM 40 MG: 40 INJECTION SUBCUTANEOUS at 08:17

## 2017-07-12 RX ADMIN — Medication 325 MG: at 08:18

## 2017-07-12 RX ADMIN — TRAMADOL HYDROCHLORIDE 50 MG: 50 TABLET, COATED ORAL at 11:33

## 2017-07-12 RX ADMIN — TRAMADOL HYDROCHLORIDE 50 MG: 50 TABLET, COATED ORAL at 05:29

## 2017-07-12 RX ADMIN — Medication 500 MG: at 08:18

## 2017-07-12 RX ADMIN — SENNOSIDES 8.6 MG: 8.6 TABLET, FILM COATED ORAL at 08:18

## 2017-07-25 ENCOUNTER — HOSPITAL ENCOUNTER (OUTPATIENT)
Dept: RADIOLOGY | Facility: HOSPITAL | Age: 37
Discharge: HOME | End: 2017-07-25
Attending: ORTHOPAEDIC SURGERY
Payer: COMMERCIAL

## 2017-07-25 ENCOUNTER — ALLSCRIPTS OFFICE VISIT (OUTPATIENT)
Dept: OTHER | Facility: OTHER | Age: 37
End: 2017-07-25

## 2017-07-25 DIAGNOSIS — Z47.1 AFTERCARE FOLLOWING JOINT REPLACEMENT SURGERY: ICD-10-CM

## 2017-07-25 PROCEDURE — 73502 X-RAY EXAM HIP UNI 2-3 VIEWS: CPT

## 2017-07-28 ENCOUNTER — GENERIC CONVERSION - ENCOUNTER (OUTPATIENT)
Dept: OTHER | Facility: OTHER | Age: 37
End: 2017-07-28

## 2017-07-28 ENCOUNTER — APPOINTMENT (OUTPATIENT)
Dept: PHYSICAL THERAPY | Facility: REHABILITATION | Age: 37
End: 2017-07-28
Payer: COMMERCIAL

## 2017-07-28 PROCEDURE — G8991 OTHER PT/OT GOAL STATUS: HCPCS | Performed by: PHYSICAL THERAPIST

## 2017-07-28 PROCEDURE — 97161 PT EVAL LOW COMPLEX 20 MIN: CPT

## 2017-07-28 PROCEDURE — G8990 OTHER PT/OT CURRENT STATUS: HCPCS | Performed by: PHYSICAL THERAPIST

## 2017-07-28 PROCEDURE — 97110 THERAPEUTIC EXERCISES: CPT

## 2017-07-31 ENCOUNTER — APPOINTMENT (OUTPATIENT)
Dept: PHYSICAL THERAPY | Facility: REHABILITATION | Age: 37
End: 2017-07-31
Payer: COMMERCIAL

## 2017-07-31 PROCEDURE — 97112 NEUROMUSCULAR REEDUCATION: CPT

## 2017-07-31 PROCEDURE — G0283 ELEC STIM OTHER THAN WOUND: HCPCS

## 2017-07-31 PROCEDURE — 97140 MANUAL THERAPY 1/> REGIONS: CPT

## 2017-07-31 PROCEDURE — 97110 THERAPEUTIC EXERCISES: CPT

## 2017-07-31 PROCEDURE — 97014 ELECTRIC STIMULATION THERAPY: CPT

## 2017-08-03 ENCOUNTER — APPOINTMENT (OUTPATIENT)
Dept: PHYSICAL THERAPY | Facility: REHABILITATION | Age: 37
End: 2017-08-03
Payer: COMMERCIAL

## 2017-08-03 PROCEDURE — G0283 ELEC STIM OTHER THAN WOUND: HCPCS

## 2017-08-03 PROCEDURE — 97112 NEUROMUSCULAR REEDUCATION: CPT

## 2017-08-03 PROCEDURE — 97014 ELECTRIC STIMULATION THERAPY: CPT

## 2017-08-03 PROCEDURE — 97110 THERAPEUTIC EXERCISES: CPT

## 2017-08-03 PROCEDURE — 97140 MANUAL THERAPY 1/> REGIONS: CPT

## 2017-08-07 ENCOUNTER — APPOINTMENT (OUTPATIENT)
Dept: PHYSICAL THERAPY | Facility: REHABILITATION | Age: 37
End: 2017-08-07
Payer: COMMERCIAL

## 2017-08-07 PROCEDURE — G0283 ELEC STIM OTHER THAN WOUND: HCPCS

## 2017-08-07 PROCEDURE — 97112 NEUROMUSCULAR REEDUCATION: CPT

## 2017-08-07 PROCEDURE — 97014 ELECTRIC STIMULATION THERAPY: CPT

## 2017-08-07 PROCEDURE — 97110 THERAPEUTIC EXERCISES: CPT

## 2017-08-07 PROCEDURE — 97140 MANUAL THERAPY 1/> REGIONS: CPT

## 2017-08-08 ENCOUNTER — APPOINTMENT (OUTPATIENT)
Dept: PHYSICAL THERAPY | Facility: REHABILITATION | Age: 37
End: 2017-08-08
Payer: COMMERCIAL

## 2017-08-08 PROCEDURE — 97140 MANUAL THERAPY 1/> REGIONS: CPT

## 2017-08-08 PROCEDURE — 97112 NEUROMUSCULAR REEDUCATION: CPT

## 2017-08-08 PROCEDURE — 97110 THERAPEUTIC EXERCISES: CPT

## 2017-08-10 ENCOUNTER — APPOINTMENT (OUTPATIENT)
Dept: PHYSICAL THERAPY | Facility: REHABILITATION | Age: 37
End: 2017-08-10
Payer: COMMERCIAL

## 2017-08-10 PROCEDURE — 97110 THERAPEUTIC EXERCISES: CPT

## 2017-08-10 PROCEDURE — 97014 ELECTRIC STIMULATION THERAPY: CPT

## 2017-08-10 PROCEDURE — 97140 MANUAL THERAPY 1/> REGIONS: CPT

## 2017-08-10 PROCEDURE — 97112 NEUROMUSCULAR REEDUCATION: CPT

## 2017-08-10 PROCEDURE — G0283 ELEC STIM OTHER THAN WOUND: HCPCS

## 2017-08-15 ENCOUNTER — APPOINTMENT (OUTPATIENT)
Dept: PHYSICAL THERAPY | Facility: REHABILITATION | Age: 37
End: 2017-08-15
Payer: COMMERCIAL

## 2017-08-15 PROCEDURE — 97110 THERAPEUTIC EXERCISES: CPT

## 2017-08-15 PROCEDURE — 97112 NEUROMUSCULAR REEDUCATION: CPT

## 2017-08-15 PROCEDURE — G0283 ELEC STIM OTHER THAN WOUND: HCPCS

## 2017-08-15 PROCEDURE — 97014 ELECTRIC STIMULATION THERAPY: CPT

## 2017-08-15 PROCEDURE — 97140 MANUAL THERAPY 1/> REGIONS: CPT

## 2017-08-17 ENCOUNTER — APPOINTMENT (OUTPATIENT)
Dept: PHYSICAL THERAPY | Facility: REHABILITATION | Age: 37
End: 2017-08-17
Payer: COMMERCIAL

## 2017-08-17 ENCOUNTER — GENERIC CONVERSION - ENCOUNTER (OUTPATIENT)
Dept: OTHER | Facility: OTHER | Age: 37
End: 2017-08-17

## 2017-08-17 PROCEDURE — 97112 NEUROMUSCULAR REEDUCATION: CPT

## 2017-08-17 PROCEDURE — G0283 ELEC STIM OTHER THAN WOUND: HCPCS

## 2017-08-17 PROCEDURE — 97110 THERAPEUTIC EXERCISES: CPT

## 2017-08-17 PROCEDURE — 97014 ELECTRIC STIMULATION THERAPY: CPT

## 2017-08-17 PROCEDURE — 97140 MANUAL THERAPY 1/> REGIONS: CPT

## 2017-08-21 ENCOUNTER — APPOINTMENT (OUTPATIENT)
Dept: PHYSICAL THERAPY | Facility: REHABILITATION | Age: 37
End: 2017-08-21
Payer: COMMERCIAL

## 2017-08-22 ENCOUNTER — APPOINTMENT (OUTPATIENT)
Dept: PHYSICAL THERAPY | Facility: REHABILITATION | Age: 37
End: 2017-08-22
Payer: COMMERCIAL

## 2017-08-23 ENCOUNTER — APPOINTMENT (OUTPATIENT)
Dept: PHYSICAL THERAPY | Facility: REHABILITATION | Age: 37
End: 2017-08-23
Payer: COMMERCIAL

## 2017-08-24 ENCOUNTER — APPOINTMENT (OUTPATIENT)
Dept: PHYSICAL THERAPY | Facility: REHABILITATION | Age: 37
End: 2017-08-24
Payer: COMMERCIAL

## 2017-08-25 ENCOUNTER — ALLSCRIPTS OFFICE VISIT (OUTPATIENT)
Dept: OTHER | Facility: OTHER | Age: 37
End: 2017-08-25

## 2017-08-25 ENCOUNTER — LAB REQUISITION (OUTPATIENT)
Dept: LAB | Facility: HOSPITAL | Age: 37
End: 2017-08-25
Payer: COMMERCIAL

## 2017-08-25 ENCOUNTER — APPOINTMENT (OUTPATIENT)
Dept: PHYSICAL THERAPY | Facility: REHABILITATION | Age: 37
End: 2017-08-25
Payer: COMMERCIAL

## 2017-08-25 DIAGNOSIS — Z01.419 ENCOUNTER FOR GYNECOLOGICAL EXAMINATION WITHOUT ABNORMAL FINDING: ICD-10-CM

## 2017-08-25 PROCEDURE — 97112 NEUROMUSCULAR REEDUCATION: CPT

## 2017-08-25 PROCEDURE — 97140 MANUAL THERAPY 1/> REGIONS: CPT

## 2017-08-25 PROCEDURE — 87624 HPV HI-RISK TYP POOLED RSLT: CPT | Performed by: PHYSICIAN ASSISTANT

## 2017-08-25 PROCEDURE — 97110 THERAPEUTIC EXERCISES: CPT

## 2017-08-25 PROCEDURE — G0145 SCR C/V CYTO,THINLAYER,RESCR: HCPCS | Performed by: PHYSICIAN ASSISTANT

## 2017-08-28 ENCOUNTER — APPOINTMENT (OUTPATIENT)
Dept: PHYSICAL THERAPY | Facility: REHABILITATION | Age: 37
End: 2017-08-28
Payer: COMMERCIAL

## 2017-08-29 ENCOUNTER — APPOINTMENT (OUTPATIENT)
Dept: PHYSICAL THERAPY | Facility: REHABILITATION | Age: 37
End: 2017-08-29
Payer: COMMERCIAL

## 2017-08-29 LAB — HPV RRNA GENITAL QL NAA+PROBE: ABNORMAL

## 2017-08-29 PROCEDURE — 97110 THERAPEUTIC EXERCISES: CPT

## 2017-08-29 PROCEDURE — 97140 MANUAL THERAPY 1/> REGIONS: CPT

## 2017-08-29 PROCEDURE — 97112 NEUROMUSCULAR REEDUCATION: CPT

## 2017-08-30 ENCOUNTER — APPOINTMENT (OUTPATIENT)
Dept: PHYSICAL THERAPY | Facility: REHABILITATION | Age: 37
End: 2017-08-30
Payer: COMMERCIAL

## 2017-08-30 LAB
LAB AP GYN PRIMARY INTERPRETATION: NORMAL
LAB AP LMP: NORMAL
Lab: NORMAL

## 2017-08-31 ENCOUNTER — APPOINTMENT (OUTPATIENT)
Dept: PHYSICAL THERAPY | Facility: REHABILITATION | Age: 37
End: 2017-08-31
Payer: COMMERCIAL

## 2017-09-01 ENCOUNTER — GENERIC CONVERSION - ENCOUNTER (OUTPATIENT)
Dept: OTHER | Facility: OTHER | Age: 37
End: 2017-09-01

## 2017-09-06 ENCOUNTER — APPOINTMENT (OUTPATIENT)
Dept: PHYSICAL THERAPY | Facility: REHABILITATION | Age: 37
End: 2017-09-06
Payer: COMMERCIAL

## 2017-09-07 ENCOUNTER — APPOINTMENT (OUTPATIENT)
Dept: PHYSICAL THERAPY | Facility: REHABILITATION | Age: 37
End: 2017-09-07
Payer: COMMERCIAL

## 2017-09-07 PROCEDURE — 97110 THERAPEUTIC EXERCISES: CPT

## 2017-09-07 PROCEDURE — 97112 NEUROMUSCULAR REEDUCATION: CPT

## 2017-09-07 PROCEDURE — 97140 MANUAL THERAPY 1/> REGIONS: CPT

## 2017-09-08 ENCOUNTER — APPOINTMENT (OUTPATIENT)
Dept: PHYSICAL THERAPY | Facility: REHABILITATION | Age: 37
End: 2017-09-08
Payer: COMMERCIAL

## 2017-09-08 PROCEDURE — 97140 MANUAL THERAPY 1/> REGIONS: CPT

## 2017-09-08 PROCEDURE — 97112 NEUROMUSCULAR REEDUCATION: CPT

## 2017-09-08 PROCEDURE — 97110 THERAPEUTIC EXERCISES: CPT

## 2017-09-11 ENCOUNTER — APPOINTMENT (OUTPATIENT)
Dept: PHYSICAL THERAPY | Facility: REHABILITATION | Age: 37
End: 2017-09-11
Payer: COMMERCIAL

## 2017-09-14 ENCOUNTER — APPOINTMENT (OUTPATIENT)
Dept: PHYSICAL THERAPY | Facility: REHABILITATION | Age: 37
End: 2017-09-14
Payer: COMMERCIAL

## 2017-09-14 PROCEDURE — 97112 NEUROMUSCULAR REEDUCATION: CPT

## 2017-09-14 PROCEDURE — 97110 THERAPEUTIC EXERCISES: CPT

## 2017-09-14 PROCEDURE — 97140 MANUAL THERAPY 1/> REGIONS: CPT

## 2017-09-19 ENCOUNTER — APPOINTMENT (OUTPATIENT)
Dept: PHYSICAL THERAPY | Facility: REHABILITATION | Age: 37
End: 2017-09-19
Payer: COMMERCIAL

## 2017-09-20 ENCOUNTER — APPOINTMENT (OUTPATIENT)
Dept: PHYSICAL THERAPY | Facility: REHABILITATION | Age: 37
End: 2017-09-20
Payer: COMMERCIAL

## 2017-09-20 PROCEDURE — 97140 MANUAL THERAPY 1/> REGIONS: CPT

## 2017-09-20 PROCEDURE — 97110 THERAPEUTIC EXERCISES: CPT

## 2017-09-21 ENCOUNTER — TRANSCRIBE ORDERS (OUTPATIENT)
Dept: LAB | Facility: HOSPITAL | Age: 37
End: 2017-09-21

## 2017-09-21 ENCOUNTER — APPOINTMENT (OUTPATIENT)
Dept: LAB | Facility: HOSPITAL | Age: 37
End: 2017-09-21
Payer: COMMERCIAL

## 2017-09-21 ENCOUNTER — APPOINTMENT (OUTPATIENT)
Dept: PHYSICAL THERAPY | Facility: REHABILITATION | Age: 37
End: 2017-09-21
Payer: COMMERCIAL

## 2017-09-21 DIAGNOSIS — Z13.220 SCREENING FOR LIPOID DISORDERS: ICD-10-CM

## 2017-09-21 DIAGNOSIS — L40.9 PSORIASIS: Primary | ICD-10-CM

## 2017-09-21 DIAGNOSIS — L40.9 PSORIASIS: ICD-10-CM

## 2017-09-21 LAB
ALBUMIN SERPL BCP-MCNC: 3.6 G/DL (ref 3.5–5)
ALP SERPL-CCNC: 128 U/L (ref 46–116)
ALT SERPL W P-5'-P-CCNC: 26 U/L (ref 12–78)
ANION GAP SERPL CALCULATED.3IONS-SCNC: 8 MMOL/L (ref 4–13)
AST SERPL W P-5'-P-CCNC: 17 U/L (ref 5–45)
BILIRUB SERPL-MCNC: 0.27 MG/DL (ref 0.2–1)
BUN SERPL-MCNC: 14 MG/DL (ref 5–25)
CALCIUM SERPL-MCNC: 8.5 MG/DL (ref 8.3–10.1)
CHLORIDE SERPL-SCNC: 108 MMOL/L (ref 100–108)
CHOLEST SERPL-MCNC: 180 MG/DL (ref 50–200)
CO2 SERPL-SCNC: 24 MMOL/L (ref 21–32)
CREAT SERPL-MCNC: 0.52 MG/DL (ref 0.6–1.3)
GFR SERPL CREATININE-BSD FRML MDRD: 122 ML/MIN/1.73SQ M
GLUCOSE P FAST SERPL-MCNC: 104 MG/DL (ref 65–99)
HDLC SERPL-MCNC: 37 MG/DL (ref 40–60)
LDLC SERPL CALC-MCNC: 112 MG/DL (ref 0–100)
POTASSIUM SERPL-SCNC: 3.9 MMOL/L (ref 3.5–5.3)
PROT SERPL-MCNC: 8 G/DL (ref 6.4–8.2)
SODIUM SERPL-SCNC: 140 MMOL/L (ref 136–145)
TRIGL SERPL-MCNC: 153 MG/DL

## 2017-09-21 PROCEDURE — 97110 THERAPEUTIC EXERCISES: CPT

## 2017-09-21 PROCEDURE — 80061 LIPID PANEL: CPT

## 2017-09-21 PROCEDURE — 97112 NEUROMUSCULAR REEDUCATION: CPT

## 2017-09-21 PROCEDURE — 36415 COLL VENOUS BLD VENIPUNCTURE: CPT

## 2017-09-21 PROCEDURE — 80053 COMPREHEN METABOLIC PANEL: CPT

## 2017-09-21 PROCEDURE — 97140 MANUAL THERAPY 1/> REGIONS: CPT

## 2017-09-25 ENCOUNTER — APPOINTMENT (OUTPATIENT)
Dept: PHYSICAL THERAPY | Facility: REHABILITATION | Age: 37
End: 2017-09-25
Payer: COMMERCIAL

## 2017-09-26 ENCOUNTER — APPOINTMENT (OUTPATIENT)
Dept: PHYSICAL THERAPY | Facility: REHABILITATION | Age: 37
End: 2017-09-26
Payer: COMMERCIAL

## 2017-09-26 ENCOUNTER — HOSPITAL ENCOUNTER (OUTPATIENT)
Dept: RADIOLOGY | Facility: HOSPITAL | Age: 37
Discharge: HOME | End: 2017-09-26
Attending: ORTHOPAEDIC SURGERY
Payer: COMMERCIAL

## 2017-09-26 ENCOUNTER — GENERIC CONVERSION - ENCOUNTER (OUTPATIENT)
Dept: OTHER | Facility: OTHER | Age: 37
End: 2017-09-26

## 2017-09-26 DIAGNOSIS — Z48.89 OTHER SPECIFIED AFTERCARE FOLLOWING SURGERY: ICD-10-CM

## 2017-09-26 DIAGNOSIS — Z47.1 AFTERCARE FOLLOWING JOINT REPLACEMENT SURGERY: ICD-10-CM

## 2017-09-26 PROCEDURE — 97110 THERAPEUTIC EXERCISES: CPT

## 2017-09-26 PROCEDURE — 73502 X-RAY EXAM HIP UNI 2-3 VIEWS: CPT

## 2017-09-26 PROCEDURE — 97112 NEUROMUSCULAR REEDUCATION: CPT

## 2017-09-26 PROCEDURE — 97140 MANUAL THERAPY 1/> REGIONS: CPT

## 2017-09-28 ENCOUNTER — APPOINTMENT (OUTPATIENT)
Dept: PHYSICAL THERAPY | Facility: REHABILITATION | Age: 37
End: 2017-09-28
Payer: COMMERCIAL

## 2017-10-03 ENCOUNTER — APPOINTMENT (OUTPATIENT)
Dept: PHYSICAL THERAPY | Facility: REHABILITATION | Age: 37
End: 2017-10-03
Payer: COMMERCIAL

## 2017-10-09 ENCOUNTER — APPOINTMENT (OUTPATIENT)
Dept: PHYSICAL THERAPY | Facility: REHABILITATION | Age: 37
End: 2017-10-09
Payer: COMMERCIAL

## 2017-10-11 ENCOUNTER — APPOINTMENT (OUTPATIENT)
Dept: PHYSICAL THERAPY | Facility: REHABILITATION | Age: 37
End: 2017-10-11
Payer: COMMERCIAL

## 2017-10-11 PROCEDURE — 97140 MANUAL THERAPY 1/> REGIONS: CPT

## 2017-10-11 PROCEDURE — 97112 NEUROMUSCULAR REEDUCATION: CPT

## 2017-10-11 PROCEDURE — 97110 THERAPEUTIC EXERCISES: CPT

## 2017-10-12 ENCOUNTER — APPOINTMENT (OUTPATIENT)
Dept: PHYSICAL THERAPY | Facility: REHABILITATION | Age: 37
End: 2017-10-12
Payer: COMMERCIAL

## 2017-10-16 ENCOUNTER — APPOINTMENT (OUTPATIENT)
Dept: PHYSICAL THERAPY | Facility: REHABILITATION | Age: 37
End: 2017-10-16
Payer: COMMERCIAL

## 2017-10-17 ENCOUNTER — APPOINTMENT (OUTPATIENT)
Dept: PHYSICAL THERAPY | Facility: REHABILITATION | Age: 37
End: 2017-10-17
Payer: COMMERCIAL

## 2017-10-17 PROCEDURE — 97110 THERAPEUTIC EXERCISES: CPT

## 2017-10-17 PROCEDURE — 97112 NEUROMUSCULAR REEDUCATION: CPT

## 2017-10-18 ENCOUNTER — APPOINTMENT (OUTPATIENT)
Dept: PHYSICAL THERAPY | Facility: REHABILITATION | Age: 37
End: 2017-10-18
Payer: COMMERCIAL

## 2017-10-19 ENCOUNTER — APPOINTMENT (OUTPATIENT)
Dept: PHYSICAL THERAPY | Facility: REHABILITATION | Age: 37
End: 2017-10-19
Payer: COMMERCIAL

## 2017-10-19 ENCOUNTER — GENERIC CONVERSION - ENCOUNTER (OUTPATIENT)
Dept: OTHER | Facility: OTHER | Age: 37
End: 2017-10-19

## 2017-10-19 PROCEDURE — 97140 MANUAL THERAPY 1/> REGIONS: CPT

## 2017-10-19 PROCEDURE — 97110 THERAPEUTIC EXERCISES: CPT

## 2017-10-23 ENCOUNTER — APPOINTMENT (OUTPATIENT)
Dept: PHYSICAL THERAPY | Facility: REHABILITATION | Age: 37
End: 2017-10-23
Payer: COMMERCIAL

## 2017-10-25 ENCOUNTER — APPOINTMENT (OUTPATIENT)
Dept: PHYSICAL THERAPY | Facility: REHABILITATION | Age: 37
End: 2017-10-25
Payer: COMMERCIAL

## 2017-10-26 ENCOUNTER — APPOINTMENT (OUTPATIENT)
Dept: PHYSICAL THERAPY | Facility: REHABILITATION | Age: 37
End: 2017-10-26
Payer: COMMERCIAL

## 2017-10-27 ENCOUNTER — ALLSCRIPTS OFFICE VISIT (OUTPATIENT)
Dept: OTHER | Facility: OTHER | Age: 37
End: 2017-10-27

## 2017-10-27 LAB — HCG, QUALITATIVE (HISTORICAL): NEGATIVE

## 2017-10-30 ENCOUNTER — APPOINTMENT (OUTPATIENT)
Dept: PHYSICAL THERAPY | Facility: REHABILITATION | Age: 37
End: 2017-10-30
Payer: COMMERCIAL

## 2017-11-01 NOTE — PROCEDURES
Assessment  1  Encounter for insertion of mirena IUD (V25 11) (Z30 430)    Plan  Encounter for insertion of mirena IUD    · Urine HCG- POC; Status:Complete;   Done: 76ORW1401 03:53PM   Performed: In Office; [phone]; Last Updated By:Darrell Mata; 10/27/2017 3:53:11 PM;Ordered; Today;For:Encounter for insertion of mirena IUD; Ordered By:Mike Vallejo;    Discussion/Summary  Discussion Summary:   Here today for Mirena placement - has had prior paragard for 7 years then Mirena for 5 yeast and desires another mirena to be placed - without difficulty and patient tolerated without problemcontinue routine gyn f/u with Dr Charito Hurst when due  Medication SE Review and Pt Understands Tx: The treatment plan was reviewed with the patient/guardian  The patient/guardian understands and agrees with the treatment plan      Active Problems  1  Abnormal Pap smear of cervix (795 00) (R87 619)   2  Advanced maternal age (AMA) in pregnancy (659 60)   3  Aftercare following left hip joint replacement surgery (V54 81,V43 64) (Z47 1,Z96 642)   4  Aftercare following surgery (V58 89) (Z48 89)   5  Alopecia totalis (704 09) (L63 0)   6  AMA (advanced maternal age) multigravida 33+, first trimester (65 59) (O200 65)   7  Anemia (285 9) (D64 9)   8  Back pain (724 5) (M54 9)   9  Bacterial vaginosis (616 10,041 9) (N76 0,B96 89)   10  Chronic low back pain (724 2,338 29) (M54 5,G89 29)   11  Contraception management (V25 9) (Z30 9)   12  Encounter for genetic counseling (V26 33)   13  Encounter for insertion of mirena IUD (V25 11) (Z30 430)   14  Encounter for postpartum visit (V24 2) (Z39 2)   15  Encounter for routine gynecological examination (V72 31) (Z01 419)   16  Encounter for supervision of normal pregnancy (V22 1) (Z34 90)   17  Gestational diabetes (648 80) (O24 419)   18  Impacted cerumen, unspecified laterality (380 4) (H61 20)   19  Joint pain, hip (719 45) (M22 559)   20  Meralgia paresthetica (355 1) (G5 10)   21   Mild cervical dysplasia (622 11) (N87 0)   22  Multigravida of advanced maternal age in third trimester (65 56) (O26 5)   21  Need for prophylactic vaccination and inoculation against influenza (V04 81) (Z23)   24  Need for rhogam due to Rh negative mother (V07 2) (Z29 13)   25  Need for Tdap vaccination (V06 1) (Z23)   26  Oral contraceptive use (V25 41) (Z30 41)   27  Osteoarthritis resulting from left hip dysplasia (715 25) (M16 32)   28  Pap smear, as part of routine gynecological examination (V76 2) (Z01 419)   29  Pregnancy (V22 2) (Z34 90)   30  Recurrent pregnancy loss, currently pregnant, first trimester (646 33) (O26 21)   31  Status post left hip replacement (V43 64) (Z96 642)   32  Supervision of normal pregnancy in first trimester (V22 1) (Z34 81)   33  UTI symptoms (788 99) (R39 9)   34  Vaginal itching (698 1) (L29 8)   35  Vitamin D deficiency (268 9) (E55 9)    Current Meds  1  Folic Acid 1 MG Oral Tablet; TAKE 1 TABLET DAILY AS DIRECTED; Therapy: 36HMF6721 to (Evaluate:29Jun2017)  Requested for: 76QGH5046; Last   Rx:38Hyy8173 Ordered   2  Vitamin C 500 MG Oral Capsule; Take 1 capsule twice daily; Therapy: 32SZR2658 to (Evaluate:29Jun2017)  Requested for: 51RVV8667; Last   Rx:08Dxp8158 Ordered    Allergies  1  Gabapentin CAPS  2  Seasonal   3  Shellfish    Physical Exam    Constitutional   General appearance: No acute distress, well appearing and well nourished  -- well nourished female  Genitourinary   External genitalia: Normal and no lesions appreciated  Vagina: Normal, no lesions or dryness appreciated  Urethra: Normal     Urethral meatus: Normal     Bladder: Normal, soft, non-tender and no prolapse or masses appreciated  Cervix: Normal, no palpable masses  Uterus: Normal, non-tender, not enlarged, and no palpable masses  Adnexa/parametria: Normal, non-tender and no fullness or masses appreciated  Abdomen   Abdomen: Normal, non-tender, and no organomegaly noted      Liver and spleen: No hepatomegaly or splenomegaly  Examination for hernias: No hernias appreciated  Psychiatric   Orientation to person, place, and time: Normal     Mood and affect: Normal        Procedure    Procedure: intrauterine device (IUD) placement  Risks and benefits were discussed with the  We discussed possible complications and risks, including infection,-- bleeding,-- pelvic pain,-- expulsion,-- failure,-- uterine perforation-- and-- increased risk of ectopic should pregnancy occur  written consent was obtained prior to the procedure and is detailed in the patient's record  Prior to the start of the procedure a time out was taken and the identity of the patient was confirmed via name and date of birth with the patient  The correct procedure to be performed were confirmed  The positioning of the patient was verified  The availability of the correct equipment was verified  the patient's LMP was 10/7/17-- and-- a pregnancy test was performed and confirmed negative  Procedure Note:   Anesthesia: none  The cervix was stabilized with an Allis Clamp  The cervix allowed easy passage of a uterine sound without dilation  The uterus was anteverted-- and-- sounded to 8 cm  The Mirena IUD was gently inserted to the fundus of the uterus using standard technique  Strings were cut to 3 cm  IUD lot number: [id number]  Post-Procedure:   Patient Status: the patient tolerated the procedure well  Complications: there were no complications  Follow up: as needed  Future Appointments    Date/Time Provider Specialty Site   07/24/2018 04:30 PM JUSTA Hope 405     Signatures   Electronically signed by : JUSTA Ramírez ; Oct 31 2017  6:35PM EST                       (Author)

## 2018-01-09 NOTE — PROGRESS NOTES
2016         RE: Isadora Woodard                          To: JUSTA Nath    MR#: [id number]                                   [facility]   : [facility] [address]: [phone]                             Vikash, [address]   (Exam #: [id number])                           Fax: [phone]      The LMP of this 39year old,  G5, P1-1-2-2 patient was SEP 24 2015, giving   her an PREETHI of 2016 and a current gestational age of 42 weeks 0 days   by dates  A sonographic examination was performed on 2016 using real   time equipment  The ultrasound examination was performed using abdominal   technique  The patient has a BMI of 32 3  Her blood pressure today was   110/62  Earliest ultrasound found in her record: 11/23/15   8w3d  16 PREETHI      Cardiac motion was observed at 129 bpm       INDICATIONS      advanced maternal age   obesity   previous  delivery   long  interval pregnancy   diabetes, gestational      Exam Types      Level I      RESULTS      Fetus # 1 of 1   Vertex presentation   Fetal growth appeared normal   Placenta Location = Anterior   No placenta previa   Placenta Grade = II      MEASUREMENTS (* Included In Average GA)      AC              31 6 cm        35 weeks 5 days* (47%)   BPD              8 8 cm        35 weeks 5 days* (45%)   HC              32 3 cm        36 weeks 0 days* (39%)   Femur            6 9 cm        35 weeks 1 day * (43%)      HC/AC           1 02   FL/AC           0 22   FL/BPD          0 78   EFW (Ac/Fl/Hc)  2726 grams - 6 lbs 0 oz                 (40%)      THE AVERAGE GESTATIONAL AGE is 35 weeks 4 days +/- 21 days  AMNIOTIC FLUID      Q1: 2 3      Q2: 5 8      Q3: 4 2      Q4: 4 2   VERONIQUE Total = 16 5 cm   Amniotic Fluid: Normal      ANATOMY COMMENTS      Fetal anatomy has been previously documented; no anomalies were   identified   No fetal structural abnormality is identified on the Level I   survey today  Follow up anatomy of the lateral ventricles, 4 chamber view,   outflow tracts, diaphragm,  kidneys, stomach and bladder appear normal    Fetal interval growth and amniotic fluid volume are normal       IMPRESSION      Harris IUP   35 weeks and 4 days by this ultrasound  (PREETHI=JUL 3 2016)   Vertex presentation   Fetal growth appeared normal   Regular fetal heart rate of 129 bpm   Anterior placenta   No placenta previa      GENERAL COMMENT         I had the pleasure of seeing  Francis Fields in the [facility]    today for followup growth scan  She reports normal daily fetal movements  She denies any vaginal bleeding, leakage of fluid, or significant   contractions or pelvic pressure  There has been no major pregnancy   complications since her last visit here in the  Center  She does have gestational diabetes but is on no medications  Her blood   sugars appear well controlled  On today's ultrasound, the fetus was in a vertex presentation  The   amniotic fluid appeared very normal today  Fetal growth was within normal   range  The interval anatomy appeared very appropriate with no obvious   anomalies seen today  Fetal breathing was seen today as well  There were   no signs of fetal hyperglycemia on today's ultrasound         We discussed the importance of initiating fetal kick counting at least   once daily  We discussed the "10 kicks in 2 hour rule"  I instructed her   to report to you immediately should criteria not be met  Kick counts   should begin at 28 weeks gestation  Her gestational diabetes appears well controlled  There were no signs of   fetal hyperglycemia on today's ultrasound  I encouraged her to continue to   achieve euglycemia  She should continue to fax her blood sugar reports to   our Diabetes in Pregnancy Program  A post partum 75 gram 2 hour glucose   tolerance test is indicated  IMPORTANT FINDINGS ON TODAY'S ULTRASOUND: No signs of fetal hyperglycemia   on today's ultrasound         IN SUMMARY: Today's ultrasound was reassuring as the fetus is growing well   with normal amniotic fluid  The fetus was in a vertex presentation  Fetal   breathing was seen today as well  She should continue to do her kick   counts on a daily basis  No further growth ultrasounds appear indicated at   this point, given the normalcy of today's ultrasound  Certainly we would   be happy to see her again in the future if the clinical situation arise, I   will leave that up to your clinical discretion  Face-to-face time, in addition to time spent discussing ultrasound results   was 10 minutes, with greater than 50% of the time used for counseling and   coordination of care  A description of the counseling/coordination of care   is described above  Thank you very much for allowing us to participate in the care of your   patient  If you have any questions or concerns about today's visit please   do not hesitate to call me  Thank you very much  Liliana ANTHONY  Maternal Fetal Medicine      IZZY Jimenez M D     Maternal-Fetal Medicine   Electronically signed 06/02/16 10:24

## 2018-01-10 NOTE — MISCELLANEOUS
Message  Spoke with patient regarding U/A  Yeast cells seen; no growth colony count <1000  Patient to continue OTC Monistat for 7 days  Does not need abx  Has appt for follow-up 4/29  Signatures   Electronically signed by : Bridgette Frank, [facility];  Apr 27 2016 12:36PM EST                       (Author)

## 2018-01-10 NOTE — PROGRESS NOTES
2016         RE: Argentina Peacock                          To: JUSTA Thurman    MR#: [id number]                                   [facility]   : [facility] Ste[place]: [id number]                             Vikash, [address]   (Exam #: [id number])                           Fax: [phone]      The LMP of this 39year old,  5, para 2 patient was SEP 24 2015,   giving her an PREETHI of 2016 and a current gestational age of 32 weeks   6 days by dates  A sonographic examination was performed on 2016   using real time equipment  The ultrasound examination was performed using   abdominal technique  The patient has a BMI of 33 9  Her blood pressure   today was 122/59  Earliest ultrasound found in her record: 11/23/15   8w3d  16 PREETHI       Mihaela Covert was recently diagnosed with gestational diabetes with a one-hour   Glucola 199 mg/dL              Cardiac motion was observed at 136 bpm       INDICATIONS      advanced maternal age   obesity   previous  delivery   long  interval pregnancy   fetal growth   diabetes, gestational      Exam Types      Level I      RESULTS      Fetus # 1 of 1   Vertex presentation   Fetal growth appeared normal   Placenta Location = Anterior   No placenta previa   Placenta Grade = I      MEASUREMENTS (* Included In Average GA)      AC              23 7 cm        28 weeks 0 days* (47%)   BPD              6 9 cm        27 weeks 6 days* (38%)   HC              26 2 cm        28 weeks 2 days* (43%)   Femur            5 5 cm        29 weeks 1 day * (62%)      Humerus          4 9 cm        29 weeks 0 days  (64%)      Cerebellum       3 2 cm        28 weeks 4 days      HC/AC           1 11   FL/AC           0 23   FL/BPD          0 80   EFW (Ac/Fl/Hc)  1235 grams - 2 lbs 12 oz                 (56%)      THE AVERAGE GESTATIONAL AGE is 28 weeks 2 days +/- 14 days       AMNIOTIC FLUID      Q1: 2 4      Q2: 5 5      Q3: 4 8      Q4: 3 8   VERONIQUE Total = 16 5 cm   Amniotic Fluid: Normal      ANATOMY DETAILS      Visualized Appearing Sonographically Normal:   HEAD: (Calvarium, BPD Level, Lateral Ventricles, Cerebellum);    STOMACH,   RIGHT KIDNEY, LEFT KIDNEY, BLADDER, PLACENTA      Not Visualized:   HEART      BIOPHYSICAL PROFILE      The Biophysical Profile score was 8/8  Breathin  Movement: 2  Tone: 2  AFV: 2      IMPRESSION      Harris IUP   28 weeks and 2 days by this ultrasound  (PREETHI=2016)   Vertex presentation   Fetal growth appeared normal   Regular fetal heart rate of 136 bpm   Anterior placenta   No placenta previa      GENERAL COMMENT      On exam today the patient appears well, in no acute distress, and denies   any complaints  Her abdomen is non-tender  There has been appropriate interval fetal growth  Good fetal movement and   tone are seen  The amniotic fluid volume appears normal   The placenta is   anterior and it appears sonographically normal   The anatomic structures   listed above could not be optimally imaged today because of fetal   position; however, these structures were seen on a prior ultrasound and   appeared sonographically normal   The patient was informed of today's   findings and all of her questions were answered  The limitations of   ultrasound were reviewed with the patient   labor precautions and   fetal kick counts were reviewed  Gestational diabetes mellitus (GDM) is defined as glucose intolerance with   onset during pregnancy  This affects between 2% and 14% of all gravid   women  We discussed that pregnancies complicated by GDM are associated   with increased  and maternal morbidity  Fetal risks include   macrosomia, shoulder dystocia, birth injuries, hypoglycemia,   hyperbilirubinemia, and potential long-term sequelae such as obesity and   impaired intellectual achievement    Maternal risks include preeclampsia   and hypertensive disorders of pregnancy, operative delivery, and the   development of subsequent diabetes mellitus  Aggressive treatment of GDM   with diet, exercise, and medications including metformin, glyburide, and   insulin have been shown to decrease fetal and  morbidity and   mortality  We have a diabetes in pregnancy program where we follow our   patients closely to help achieve optimal maternal and  outcomes  Recommend the patient be referred to this program for management  Recommend the patient return in 4 weeks for a growth ultrasound secondary   to gestational diabetes  Thank you very much for allowing us to participate in the care of this   very nice patient  Should you have any questions, please do not hesitate   to contact our office  Please note, in addition to the time spent discussing the results of the   ultrasound, I spent approximately 10 minutes of face-to-face time with the   patient, greater than 50% of which was spent in counseling and the   coordination of care for this patient  IZZY Elias M D     Electronically signed 16 09:44

## 2018-01-10 NOTE — MISCELLANEOUS
Message  Return to work or school:   Maciej Shields is under my professional care  She was seen in my office on 7/25/2017       Ptient may attend meetings on August 16, 2017 and August 17, 2017  Wilson Moe PA-C  Signatures   Electronically signed by : SRINIVAS Glover;  Aug 15 2017  4:33PM EST                       (Author)

## 2018-01-12 NOTE — PROGRESS NOTES
Chief Complaint  PT WAS GIVING A 0 5 ML FLULAVAL QUADRIVALENT INJECTION ON THE LEFT DELTOID ON 01/20/2016      Active Problems    1  Abnormal Pap smear of cervix (795 00) (R87 619)   2  Advanced maternal age (AMA) in pregnancy (659 60)   3  Alopecia totalis (704 09) (L63 0)   4  AMA (advanced maternal age) multigravida 33+, first trimester (65 59) (O200 65)   5  Anemia (285 9) (D64 9)   6  Bacterial vaginosis (616 10,041 9) (N76 0,A49 9)   7  Chronic low back pain (724 2,338 29) (M54 5,G89 29)   8  Encounter for genetic counseling (V26 33) (Z31 5)   9  Encounter for supervision of normal pregnancy (V22 1) (Z34 90)   10  Impacted cerumen, unspecified laterality (380 4) (H61 20)   11  Meralgia paresthetica (355 1) (G57 10)   12  Mild cervical dysplasia (622 11) (N87 0)   13  Need for prophylactic vaccination and inoculation against influenza (V04 81) (Z23)   14  Pregnancy (V22 2) (Z33 1)   15  Recurrent pregnancy loss, currently pregnant, first trimester (646 33) (O26 21)   16  Supervision of normal pregnancy in first trimester (V22 1) (Z34 81)   17  Vaginal itching (698 1) (L29 8)   18  Vitamin D deficiency (268 9) (E55 9)    Current Meds   1  Folic Acid CAPS; TAKE 1 CAPSULE DAILY; Therapy: (Recorded:98Pfg3672) to Recorded   2  Naproxen 500 MG Oral Tablet; TAKE 1 TABLET BY MOUTH EVERY 12 HOURS AS   NEEDED FOR PAIN;   Therapy: 40KFK9404 to (Sakina Ny)  Requested for: 80HOP2220; Last   Rx:07Oct2014 Ordered   3  Prenatal Vitamin TABS; TAKE 1 TABLET DAILY; Therapy: (Recorded:22Vzv8892) to Recorded    Allergies    1  No Known Drug Allergies    2  Seasonal   3   Shellfish    Vitals  Signs [Data Includes: Current Encounter]    Systolic: 349  Diastolic: 62  Height: 5 ft 6 in  Weight: 198 lb 6 08 oz  BMI Calculated: 32 02  BSA Calculated: 2    Results/Data  OB Global Urine Dip Non-Automated - POC 25ZQI2927 10:03AM Robles Meng     Test Name Result Flag Reference   Color Yellow     Clarity 0 5M Leukocytes NEGATIVE     Nitrite NEGATIVE     Blood NEGATIVE     Bilirubin NEGATIVE     Urobilinogen NEGATIVE     Protein NEGATIVE     Ph NEGATIVE     Specific Gravity NEGATIVE     Ketone NEGATIVE     Glucose NEGATIVE         Plan  Pregnancy    · OB Global Urine Dip Non-Automated - POC; Status:Complete;   Done: 73XRF2833  10:03AM    Future Appointments    Date/Time Provider Specialty Site   2016 09:45 AM Helmut Lanes, M D   [address] [facility]   02/10/2016 09:30 AM  [facility], [address]   Electronically signed by : Shantell Munguia, [facility]; 2016 10:36AM EST                       (Author)    Electronically signed by : JUSTA Munoz ; 2016 11:56AM EST                       (Author)

## 2018-01-13 VITALS
RESPIRATION RATE: 20 BRPM | BODY MASS INDEX: 30.34 KG/M2 | SYSTOLIC BLOOD PRESSURE: 116 MMHG | DIASTOLIC BLOOD PRESSURE: 71 MMHG | HEART RATE: 91 BPM | WEIGHT: 188 LBS

## 2018-01-13 VITALS
WEIGHT: 189.25 LBS | BODY MASS INDEX: 30.41 KG/M2 | HEIGHT: 66 IN | SYSTOLIC BLOOD PRESSURE: 120 MMHG | DIASTOLIC BLOOD PRESSURE: 74 MMHG

## 2018-01-13 VITALS
SYSTOLIC BLOOD PRESSURE: 118 MMHG | DIASTOLIC BLOOD PRESSURE: 66 MMHG | HEIGHT: 66 IN | BODY MASS INDEX: 31.5 KG/M2 | WEIGHT: 196 LBS

## 2018-01-14 VITALS
HEIGHT: 66 IN | BODY MASS INDEX: 31.42 KG/M2 | WEIGHT: 195.5 LBS | DIASTOLIC BLOOD PRESSURE: 73 MMHG | HEART RATE: 73 BPM | SYSTOLIC BLOOD PRESSURE: 114 MMHG

## 2018-01-14 VITALS
DIASTOLIC BLOOD PRESSURE: 83 MMHG | HEART RATE: 73 BPM | HEIGHT: 66 IN | SYSTOLIC BLOOD PRESSURE: 125 MMHG | WEIGHT: 194.5 LBS | BODY MASS INDEX: 31.26 KG/M2

## 2018-01-14 NOTE — MISCELLANEOUS
Message  Return to work or school:   Jeremie Meadows is under my professional care  She was seen in my office on 7/25/2017   She is able to return to work on  08/28/2017      Sedentary duty only  Isaac Alston PA-C  Signatures   Electronically signed by : SRINIVAS Siddiqui;  Aug 17 2017  2:52PM EST                       (Author)

## 2018-01-15 NOTE — MISCELLANEOUS
Message  Spoke with patient regarding PAP results  Cytology negative, but HR HPV other positive  Patient has been diagnosed with this in the past  Counseled patient on HPV; reassured her that her cervical cells were normal, and there is no cervical cancer  ASCCP guidelines recommend repeat cotesting in 1 year        Signatures   Electronically signed by : Lorraine Waller [facility]; Sep  1 2017  4:56PM EST                       (Author)

## 2018-01-15 NOTE — MISCELLANEOUS
Message  Return to work or school:   America Baig is under my professional care  She was seen in my office on 05/30/2017   She is able to return to work on  10/2/2017       Usama Mart PA-C  Signatures   Electronically signed by :  Usama Mart, [facility]; Jun 19 2017  5:33PM EST                       (Author)

## 2018-01-16 NOTE — PROGRESS NOTES
MAY 4 2016         RE: Henrietta Cuello                          To: JUSTA Lopez    MR#: [id number]                                   [facility]   : [facility] [address]: [id number] Suh, [address]   (Exam #: [id number])                           Fax: [phone]      The LMP of this 39year old,  G5, P1-1-2-2 patient was SEP 24 2015, giving   her an PREETHI of 2016 and a current gestational age of 34 weeks 6 days   by dates  A sonographic examination was performed on MAY 4 2016 using real   time equipment  The ultrasound examination was performed using abdominal   technique  The patient has a BMI of 32 9  Her blood pressure today was   98/54  Earliest ultrasound found in her record: 11/23/15   8w3d  16 PREETHI      Cardiac motion was observed at 148 bpm       INDICATIONS      advanced maternal age   obesity   previous  delivery   long  interval pregnancy   fetal growth   diabetes, gestational      Exam Types      Level I      RESULTS      Fetus # 1 of 1   Vertex presentation   Placenta Location = Anterior   Placenta Grade = II      MEASUREMENTS (* Included In Average GA)      AC              27 4 cm        31 weeks 4 days* (40%)   BPD              7 9 cm        31 weeks 4 days* (34%)   HC              29 7 cm        32 weeks 4 days* (43%)   Femur            6 3 cm        32 weeks 2 days* (56%)      Cerebellum       3 8 cm        32 weeks 4 days      HC/AC           1 08   FL/AC           0 23   FL/BPD          0 80   EFW (Ac/Fl/Hc)  1879 grams - 4 lbs 2 oz                 (45%)      THE AVERAGE GESTATIONAL AGE is 32 weeks 0 days +/- 18 days        AMNIOTIC FLUID      Q1: 3 6      Q2: 2 8      Q3: 4 0      Q4: 4 5   VERONIQUE Total = 14 9 cm   Amniotic Fluid: Normal      FETAL VESSELS                                     S/D   PI    RI    PSV   AEDV RF cm/s       Umbilical Artery                 0 73              No   No      ANATOMY      Head                                    See Details   Heart                                   See Details   Stomach                                 Normal   Right Kidney                            Normal   Left Kidney                             Normal   Bladder                                 Normal   Genitalia                               Normal   Placenta                                Normal      ANATOMY DETAILS      Visualized Appearing Sonographically Normal:   HEAD: (Calvarium, BPD Level, Lateral Ventricles, Cerebellum); HEART:   (Four Chamber View, Proximal Left Outflow, Proximal Right Outflow, Cardiac   Axis, Cardiac Position);    STOMACH, RIGHT KIDNEY, LEFT KIDNEY, BLADDER,   GENITALIA (Female), PLACENTA      BIOPHYSICAL PROFILE      The Biophysical Profile score was 8/8  Breathin  Movement: 2  Tone: 2  AFV: 2      IMPRESSION      Harris IUP   32 weeks and 0 days by this ultrasound  (PREETHI=2016)   Vertex presentation   Regular fetal heart rate of 148 bpm   Anterior placenta      GENERAL COMMENT         I had the pleasure of seeing Luis Delgado  in the [facility]   today for followup growth scan  She reports normal daily fetal movements  She denies any vaginal bleeding, leakage of fluid, or significant   contractions or pelvic pressure  There has been no major pregnancy   complications since her last visit here in the  Center  She is of   advanced maternal age  She is also obese with a BMI of 32  She has a long   interpregnancy interval and has developed gestational diabetes in the   current pregnancy  She is on no insulin at this time  On today's ultrasound, the fetus was in a vertex presentation  The   amniotic fluid appeared very normal today  Fetal growth was within normal   range   The umbilical artery Doppler pulsatility index was very normal   today and thus today's ultrasound was very reassuring  Fetal breathing was   seen today as well which is reassuring  The interval anatomy seen today   showed no obvious anomalies  We discussed the importance of initiating fetal kick counting at least   once daily  We discussed the "10 kicks in 2 hour rule"  I instructed her   to report to you immediately should criteria not be met  Kick counts   should begin at 28 weeks gestation  IMPORTANT  FINDINGS ON TODAY'S ULTRASOUND: Appropriate fetal growth, and   normal amniotic fluid         IN SUMMARY:  Today's ultrasound was reassuring as the fetus is growing   well with normal amniotic fluid and a normal umbilical artery Doppler flow   study  The fetus was in a vertex presentation  She should continue to do   her kick counts on a daily basis  A fetal growth evaluation is recommended   in 4 weeks and this was scheduled today  She should continue to fax her   blood sugars in to her diabetes team here in the  Center  At this   time, there is no need for insulin therapy  Face-to-face time, in addition to time spent discussing ultrasound results   was 10 minutes, with greater than 50% of the time used for counseling and   coordination of care  A description of the counseling/coordination of care   is described above  Thank you very much for allowing us to participate in the care of your   patient  If you have any questions or concerns about today's visit please   do not hesitate to call me  Thank you very much  Pedro ANTHONY  Maternal Fetal Medicine      IZZY Curry M D     Maternal-Fetal Medicine   Electronically signed 16 09:17

## 2018-01-17 NOTE — PROGRESS NOTES
FEB 10 2016         RE: Hunter Vang                          To: Licha Blanco, M D    MR#: [id number]                                   [facility]   : [facility] [address], [place]: [phone]                             Vikash, [address]   (Exam #: [id number])                           Fax: [phone]      The LMP of this 38year old,  5, para 2 patient was SEP 24 2015,   giving her an PREETHI of 2016 and a current gestational age of 24 weeks   6 days by dates  A sonographic examination was performed on FEB 10 2016   using real time equipment  The ultrasound examination was performed using   abdominal & vaginal techniques  The patient has a BMI of 32 4  Her blood   pressure today was 128/79        Earliest ultrasound found in her record: 11/23/15   8w3d  16 PREETHI            Cardiac motion was observed at 132 bpm       INDICATIONS      advanced maternal age   obesity   previous  delivery   long  interval pregnancy   fetal anatomical survey      Exam Types      Transvaginal   LEVEL II      RESULTS      Fetus # 1 of 1   Variable presentation   Fetal growth appeared normal   Placenta Location = Anterior   No placenta previa   Placenta Grade = I      MEASUREMENTS (* Included In Average GA)      AC              14 7 cm        19 weeks 5 days* (48%)   BPD              4 5 cm        19 weeks 5 days* (46%)   HC              17 7 cm        20 weeks 1 day * (53%)   Femur            3 4 cm        20 weeks 6 days* (61%)      Nuchal Fold      3 3 mm      Humerus          3 2 cm        20 weeks 4 days  (66%)   Radius           2 6 cm        20 weeks 5 days   Ulna             2 8 cm        20 weeks 3 days   Tibia            3 0 cm        20 weeks 6 days  (71%)   Fibula           3 0 cm        20 weeks 1 day   Foot             3 1 cm        19 weeks 3 days      Cerebellum       2 0 cm        19 weeks 5 days Biorbit          3 2 cm        20 weeks 6 days   CisternaMagna    7 4 mm      HC/AC           1 20   FL/AC           0 23   FL/BPD          0 75   EFW (Ac/Fl/Hc)   344 grams - 0 lbs 12 oz      THE AVERAGE GESTATIONAL AGE is 20 weeks 1 day +/- 10 days  AMNIOTIC FLUID      Largest Vertical Pocket = 4 6 cm   Amniotic Fluid: Normal      UTERINE ARTERIES                                  S/D   PI    RI    NOTCH       Left Uterine Artery        2 74  1 19  0 63   No       Right Uterine Artery       2 38  1 01  0 58   No      CERVICAL EVALUATION           Supine               Cervical Length: 3 40 cm        Other Test Results         Resp To T F  Pressure: No                    Funneling?: No              Dynamic Changes?: No      ANATOMY      Head                                    Normal   Face/Neck                               Normal   Th  Cav  Normal   Heart                                   Normal   Abd  Cav  Normal   Stomach                                 Normal   Right Kidney                            Normal   Left Kidney                             Normal   Bladder                                 Normal   Abd  Wall                               Normal   Spine                                   Normal   Extrems                                 Normal   Genitalia                               Normal   Placenta                                Normal   Umbl  Cord                              Normal   Uterus                                  Normal      ANATOMY DETAILS      Visualized Appearing Sonographically Normal:   HEAD: (Calvarium, BPD Level, Lateral Ventricles, Choroid Plexus,   Cerebellum, Cisterna Magna);    FACE/NECK: (Neck, Nuchal Fold, Profile,   Orbits, Nose/Lips, Palate, Face);    TH  CAV : (Diaphragm);     HEART:   (Four Chamber View, Proximal Left Outflow, Proximal Right Outflow, Aortic   Arch, Ductal Arch, Short Axis of Greater Vessels, Cardiac Axis,   Interventricular Septum, Interatrial Septum, Cardiac Position);    ABD    CAV , STOMACH, RIGHT KIDNEY, LEFT KIDNEY, BLADDER, ABD  WALL, SPINE:   (Cervical Spine, Thoracic Spine, Lumbar Spine, Sacrum);    EXTREMS: (Lt   Humerus, Rt Humerus, Lt Forearm, Rt Forearm, Lt Hand, Rt Hand, Lt Femur,   Rt Femur, Lt Low Leg, Rt Low Leg, Lt Foot, Rt Foot);    GENITALIA,   PLACENTA, UMBL  CORD, UTERUS      ANATOMY COMMENTS      No fetal structural abnormality or ultrasound marker for aneuploidy is   identified on the Level II ultrasound study today  The genitalia were   reviewed and found to be female  The patient is aware of the results of   the fetal sex  Fetal growth and amniotic fluid volume appear normal   The   maternal uterine artery Doppler study is normal   Active movement of the   fetal body & extremities was seen  There is no suspicion of a   subchorionic bleed  The placental cord insertion was normal   The cervix   seen by transvaginal scan appears normal measuring 34 mms  There is no   evidence for spontaneous funneling of the cervix seen  ADNEXA      The left ovary appeared normal and measured 2 7 x 2 0 x 1 9 cm with a   volume of 5 4 cc  The right ovary was not visualized  IMPRESSION      Harris IUP   20 weeks and 1 day by this ultrasound  (PREETHI=2016)   Variable presentation   Fetal growth appeared normal   Normal anatomy survey   Regular fetal heart rate of 132 bpm   Anterior placenta   No placenta previa      GENERAL COMMENT      I had the pleasure of seeing Lilia Quiles in the  Center   for level II ultrasound on February 10  She is a 50-year-old  5   para 20-2 with a working due date of   She is of advanced maternal   age  She is also obese  There has been a long 16 year interpregnancy   interval  She has 2 children at home one 12years of age and one 25 years   of age  Neither were born   She has a body mass index of 32   Her   blood pressure today was 128/79  She denies any pregnancy complications  RISK FACTORS: Advanced maternal age, obesity, long interpregnancy interval      Today's ultrasound was reassuring  A viable fetus was seen  The placenta   is anterior in location and there is no evidence of a placenta previa  Amniotic fluid appeared normal  Fetal growth appeared very appropriate and   correlated well with her due date  There were no obvious anomalies seen   today  The anatomic survey was completed today  There were no Down   syndrome markers seen  Both maternal ovaries were seen and appeared   normal  There were no obvious subchorionic hematomas or uterine myomas  The uterine artery Doppler flow studies were normal  Her cervix was seen   transvaginally and appeared normal in length with no evidence of funneling   or dynamic change  The placental cord insertion were seen and was normal   in location  The patient appeared well-nourished, well-developed, and in no apparent   distress  Her uterus is nontender  Her abdomen was gravid and nontender  The anatomic survey was completed today and there were no obvious anatomic   abnormalities  We discussed kick counts in the office today  We discussed the 10 kicks in   2 hour rule  I asked her to call your office if criteria are not met  She   should continue to do her kick counts on a daily basis  Kick counts should   begin at 28 weeks  MISSED ANATOMY: None      NEW FINDINGS ON TODAY'S ULTRASOUND: None, very reassuring ultrasound today   in the  Center      IN SUMMARY: Today's ultrasound was reassuring as the fetus appears to be   growing well  There were no obvious anatomic abnormalities nor any markers   for Down syndrome  The anatomic survey was completed  Her cervix was long   and closed in her uterine artery Doppler flow studies were normal  I did   recommend to followup growth scans given her advanced maternal age status     Overall I was very reassured by today's ultrasound  Thank you kindly for   this referral          Total face-to-face time with the patient, excluding ultrasound time was 15   minutes with more than 50% of the time devoted to counseling and   coordination of care  Thank you very much for allowing me to participate in the care of your   patient  If you have any questions or concerns about today's visit, please   do not hesitate to call me  Sincerely,      JUSTA Ott  Maternal Fetal Medicine      IZZY Kulkarni M D     Maternal-Fetal Medicine   Electronically signed 02/11/16 08:15

## 2018-01-18 NOTE — PROGRESS NOTES
Chief Complaint  Patient was in today for her routine OB visit and received her TDap and RhoGAM injections  Patient received both injections well with no complications  TDap- Left Deltoid  ND: [id number]  Exp:4/16/2018  LOT: [id number]    RhoGAM- Right Deltoid  ND:7892-8057-502  Exp: 12/4/2016  LOT: [id number]      Active Problems     1  Abnormal Pap smear of cervix (795 00) (R87 619)   2  Advanced maternal age (AMA) in pregnancy (659 60)   3  Alopecia totalis (704 09) (L63 0)   4  AMA (advanced maternal age) multigravida 33+, first trimester (65 59) (O200 65)   5  Anemia (285 9) (D64 9)   6  Bacterial vaginosis (616 10,041 9) (N76 0,B96 89)   7  Chronic low back pain (724 2,338 29) (M54 5,G89 29)   8  Encounter for genetic counseling (V26 33) (Z31 5)   9  Encounter for supervision of normal pregnancy (V22 1) (Z34 90)   10  Impacted cerumen, unspecified laterality (380 4) (H61 20)   11  Meralgia paresthetica (355 1) (G57 10)   12  Mild cervical dysplasia (622 11) (N87 0)   13  Need for prophylactic vaccination and inoculation against influenza (V04 81) (Z23)   14  Need for rhogam due to Rh negative mother (V07 2) (O36 0990)   15  Need for Tdap vaccination (V06 1) (Z23)   16  Pregnancy (V22 2) (Z33 1)   17  Recurrent pregnancy loss, currently pregnant, first trimester (646 33) (O26 21)   18  Supervision of normal pregnancy in first trimester (V22 1) (Z34 81)   19  Vaginal itching (698 1) (L29 8)   20  Vitamin D deficiency (268 9) (E55 9)    Gestational diabetes (648 80) (O24 419)          Current Meds   1  Prenatal Vitamin TABS; TAKE 1 TABLET DAILY; Therapy: (Recorded:86Vmt6895) to Recorded   2  RhoGAM Ultra-Filtered Plus 1500 UNIT Intramuscular Solution Prefilled Syringe; Bring to   the office for administration; Therapy: [id number] to (Last Rx:[id number])  Requested for: 54VXF7906 Ordered    Allergies    1  No Known Drug Allergies    2  Seasonal   3   Shellfish    Vitals  Signs [Data Includes: Current Encounter]    Systolic: 694  Diastolic: 76  Height: 5 ft 6 in  Weight: 210 lb 6 08 oz  BMI Calculated: 33 96  BSA Calculated: 2 05    Results/Data  OB Global Urine Dip Non-Automated - POC [id number] 08:48AM Luis Tyler     Test Name Result Flag Reference   Protein +1     Glucose neg     Protein +1     Glucose neg               Plan   Encounter for supervision of normal pregnancy    · OB Global Urine Dip Non-Automated - POC; Status:Resulted - Requires  Verification,Retrospective By Protocol Authorization;   Done: [id number] 08:48AM  Gestational diabetes    · * 1 - SL  DIABETES EDUCATION OR MNT REFERRAL Physician Referral   Consult  Status: Hold For - Scheduling,Retrospective By Protocol Authorization   Requested for: [id number]  Barriers : No  for Diabetes and Pregnancy Education (Registered Dietitian to calculate calorie      needs) Comprehensive two 1-hour sessions : Yes  date : 2016  : [id number]  Care Summary provided  : Yes  Need for rhogam due to Rh negative mother    · RhoGAM Ultra-Filtered Plus 1500 UNIT Intramuscular Solution Prefilled  Syringe    Gestational diabetes (648 80) (O25 36)          Future Appointments    Date/Time Provider Specialty Site   2016 03:00 PM Gestational Diabetic, Schedule  [facility]   2016 08:45 AM JUSTA Miranda   [address] [facility]   2016 08:30 [facility]   2016 08:30 AM  Vikash, Schedule  [facility]     Signatures   Electronically signed by : Hilda Green, ; 2016  9:11AM EST                       (Author)    Electronically signed by : JUSTA Herrera ; 2016 11:33AM EST                       (Author)

## 2018-01-18 NOTE — MISCELLANEOUS
Message  Return to work or school:   Hi De La Cruz is under my professional care  She was seen in my office on 04/20/2016   She is able to return to work on  04/21/2016      Please excuse pt for being absent due to a bad cold          Signatures   Electronically signed by : JUSTA Graves ; Apr 22 2016  9:34AM EST                       (Author)

## 2018-01-22 VITALS
DIASTOLIC BLOOD PRESSURE: 73 MMHG | HEIGHT: 66 IN | HEART RATE: 79 BPM | SYSTOLIC BLOOD PRESSURE: 110 MMHG | BODY MASS INDEX: 30.37 KG/M2 | WEIGHT: 189 LBS

## 2018-01-24 NOTE — PROGRESS NOTES
Preliminary Nursing Report                Patient Information    Initial Encounter Entry Date:   2017 8:15 AM EST (Automated Transmission Automated Transmission)       Last Modified:   {ParH  ModifiedOn}              Legal Name: Travis Coe        Social  Number:        YOB: 1980        Age (years): 44        Gender: F        Body Mass Index (BMI): 31 kg/m2        Height: 66 in  Weight: 195 lbs (88 kgs)           Address:   [address] [place] [address]              Phone: [phone]   (consent to leave messages)        Email:        Ethnicity: Decline to State        Gnosticist:        Marital Status:        Preferred Language: English        Race: Other Race                    Patient Insurance Information        Primary Insurance Information Carrier Name: {Primary  CarrierName}           Carrier Address:   {Primary  CarrierAddress}              Carrier Phone: {Primary  CarrierPhone}          Group Number: {Primary  GroupNumber}          Policy Number: {Primary  PolicyNumber}          Insured Name: {Primary  InsuredName}          Insured : {Primary  InsuredDOB}          Relationship to Insured: {Primary  RelationshiptoInsured}           Secondary Insurance Information Carrier Name: {Secondary  CarrierName}           Carrier Address:   {Secondary  CarrierAddress}              Carrier Phone: {Secondary  CarrierPhone}          Group Number: {Secondary  GroupNumber}          Policy Number: {Secondary  PolicyNumber}          Insured Name: {Secondary  InsuredName}          Insured : {Secondary  InsuredDOB}          Relationship to Insured: {Secondary  RelationshiptoInsured}                       Health Profile   Booking #:   Stephania Walls #: [id number]               DOS: 07/10/2017    Surgery : TOTAL HIP REPLACEMENT    Add'l Procedures/Notes:     Surgery Risk: Intermediate          Precautions          Allergies    No Known Drug Allergies       Seasonal       Clinical Comments: Reaction Type: , Reaction: , Severity:        Shellfish             Medications    Ferrous Sulfate 325 (65 Fe) MG Oral Tablet       Folic Acid 1 MG Oral Tablet       Prenatal Vitamin TABS       RhoGAM Ultra-Filtered Plus 1500 UNIT Intramuscular Solution Prefilled Syringe       Vitamin C 500 MG Oral Capsule               Conditions    Abnormal Pap smear of cervix       Advanced maternal age (AMA) in pregnancy       Alopecia totalis       AMA (advanced maternal age) multigravida 35+, first trimester       Anemia       Back pain       Bacterial vaginosis       Chronic low back pain       Contraception management       Encounter for genetic counseling       Encounter for postpartum visit       Encounter for supervision of normal pregnancy       Gestational diabetes       Impacted cerumen, unspecified laterality       Joint pain, hip       Meralgia paresthetica       Mild cervical dysplasia       Multigravida of advanced maternal age in third trimester       Need for prophylactic vaccination and inoculation against influenza       Need for rhogam due to Rh negative mother       Need for Tdap vaccination       Oral contraceptive use       Osteoarthritis resulting from left hip dysplasia       Recurrent pregnancy loss, currently pregnant, first trimester       Supervision of normal pregnancy in first trimester       UTI symptoms       Vaginal itching       Vitamin D deficiency               Family History    None             Surgical History    None             Social History    Never A Smoker       Never Drank Alcohol       Oral contraceptive use                               Patient Instructions       Medical Procedure Risk  NPO Instructions   The day before surgery it is recommended to have a light dinner at your usual time and you are allowed a light snack early in the evening  Do not eat anything heavy or eat a big meal after 7pm  Do not eat or drink anything after midnight prior to your surgery   If you are supposed to take any of your medications, do so with a sip of water  Failure to follow these instructions can lead to an increased risk of lung complications and may result in a delay or cancellation of your procedure  If you have any questions, contact your institution for further instructions  No candy, no gum, no mints, no chewing tobacco          Gestational diabetes  Medication Instruction (Diabetic Medication)   Please decrease your morning insulin dose to one-half of normal dose  If you are taking oral diabetes medications, the morning dose should be omitted  If taking metformin (Glucophage), discontinue the medication for 24 hours prior to surgery  If you have an insulin pump, continue at a basal rate only  Testing Considerations       ? Basic Metabolic Panel (BMP) t  If test was completed and normal within last six months, repeat test is not necessary  Triggered by: Gestational diabetes         ? Complete Blood Count (CBC) t  If test was completed and normal within last six months, repeat test is not necessary  Triggered by: Anemia         ? Electrocardiogram (ECG) t  Patient does not need new test if normal ECG is present within the last six months and no change in clinical condition  Triggered by: Medical Procedure         ? Hemoglobin A1c (HbA1c) client  If test was completed and normal within the last three months, repeat test is not necessary  Triggered by: Age or Facility Rec         ? Type and Screen client  Type and Screen - Blood: If there is anticipated or possible large blood loss with this procedure, then a Type and Screen for Blood should be ordered  Triggered by: Age or Facility Rec               Consultations       ? Pregnancy   Patient should be evaluated by OB/GYN in order to determine readiness and optimal timing of procedure  Special precautions may also be required, including fetal heart monitoring    Triggered by: Recurrent pregnancy loss, currently pregnant, first trimester, AMA (advanced maternal age) multigravida 33+, first trimester, Multigravida of advanced maternal age in third trimester         ? Primary Care Physician Evaluation   Primary care physician may need to evaluate patient prior to surgery  This is likely NOT necessary if the listed conditions are chronic and stable  Triggered by: Anemia, Gestational diabetes               Miscellaneous Questions         Question: Are you able to walk up a flight of stairs, walk up a hill or do heavy housework WITHOUT having chest pain or shortness of breath? Answer: YES                   Allergies/Conditions/Medications Not Found        The following were not recognized by our system when generating the recommendations  Please consider if this would impact any preoperative protocols  ? Never A Smoker       ? Never Drank Alcohol       ? Folic Acid 1 MG Oral Tablet                  Appointment Information         Date:    07/10/2017        Location:    [facility]        Address:           Directions:                      Footnotes revision 14      ?? Denotes a free-text entry  Legal Disclaimer: Any and all recommendations and services provided herein are designed to assist in the preoperative care of the patient  Nothing contained herein is designed to replace, eliminate or alleviate the responsibility of the attending physician to supervise and determine the patient?s preoperative care and course of treatment  Failure to provide complete, accurate information may negatively impact the system?s ability to recommend the proper preoperative protocol  THE ATTENDING PHYSICIAN IS RESPONSIBLE TO REVIEW THE SUGGESTED PREOPERATIVE PROTOCOLS/COURSE OF TREATMENT AND PRESCRIBE THE FINAL COURSE OF PREOPERATIVE TREATMENT IN CONSULTATION WITH THE PATIENT  THE [employer] SYSTEM AND ITS MATERIALS ARE PROVIDED ? AS IS? WITHOUT WARRANTY OF ANY KIND, EXPRESS OR IMPLIED, INCLUDING, BUT NOT LIMITED TO, WARRANTIES OF PERFORMANCE OR MERCHANTABILITY OR FITNESS FOR A PARTICULAR PURPOSE  PATIENT AND PHYSICIANS HEREBY AGREE THAT THEIR USE OF THE MATERIALS AND RESOURCES ACT AS A CONSENT TO RELEASE AND WAIVE ePRE[employer] FROM ANY AND ALL CLAIMS OF WARRANTY, TORT OR CONTRACT LAW OF ANY KIND  Electronically signed by:Jyoti ANTHONY    Jan 23 2018  2:54PM EST

## 2018-03-16 ENCOUNTER — TELEPHONE (OUTPATIENT)
Dept: OBGYN CLINIC | Facility: HOSPITAL | Age: 38
End: 2018-03-16

## 2018-03-16 NOTE — TELEPHONE ENCOUNTER
Call from patient   Call back # [phone]  Dr Richar Pederson   Surgery: LT ADA 07/10/17    Patient is requesting a letter or card stating she had surgery  She is going on vacation  Patient will  in office when ready

## 2018-07-23 ENCOUNTER — TELEPHONE (OUTPATIENT)
Dept: OBGYN CLINIC | Facility: HOSPITAL | Age: 38
End: 2018-07-23

## 2018-07-23 NOTE — TELEPHONE ENCOUNTER
Phone call to patient in regards to appointment for  7/24/18 and inactive insurance  Patient states that [place] is out [place] the [place] and will need to cancel appointment  She will call back to reschedule   Patient states  that she has new ins, [employer]

## 2021-03-11 ENCOUNTER — TELEPHONE (OUTPATIENT)
Dept: OBGYN CLINIC | Facility: HOSPITAL | Age: 41
End: 2021-03-11

## 2021-03-11 NOTE — TELEPHONE ENCOUNTER
Patient sees Dr Juhi Short      Patient had a L Hip replacement back in 2017 and would like to know if she needs to be on Antibiotics to be seen by a dentist or to have dental work done? Patient now lives in [place], she isn't having any problems, but wants to know if she should be seen?     Please Advise  cb [phone]

## 2021-03-11 NOTE — TELEPHONE ENCOUNTER
Spoke with patient by phone no follow-up needed at this time advised we do recommend lifetime antibiotic prophylaxis prior to dental procedures

## 2021-10-30 ENCOUNTER — HOSPITAL ENCOUNTER (EMERGENCY)
Facility: HOSPITAL | Age: 41
Discharge: HOME | End: 2021-10-30
Attending: EMERGENCY MEDICINE | Admitting: EMERGENCY MEDICINE
Payer: COMMERCIAL

## 2021-10-30 VITALS
RESPIRATION RATE: 18 BRPM | WEIGHT: 215 LBS | TEMPERATURE: 98.7 F | SYSTOLIC BLOOD PRESSURE: 144 MMHG | HEART RATE: 89 BPM | DIASTOLIC BLOOD PRESSURE: 72 MMHG | HEIGHT: 66 IN | OXYGEN SATURATION: 98 % | BODY MASS INDEX: 34.55 KG/M2

## 2021-10-30 DIAGNOSIS — H60.90 OTITIS EXTERNA: Primary | ICD-10-CM

## 2021-10-30 LAB — GLUCOSE SERPL-MCNC: 104 MG/DL (ref 65–140)

## 2021-10-30 PROCEDURE — 99283 EMERGENCY DEPT VISIT LOW MDM: CPT

## 2021-10-30 PROCEDURE — 82948 REAGENT STRIP/BLOOD GLUCOSE: CPT

## 2021-10-30 PROCEDURE — 99284 EMERGENCY DEPT VISIT MOD MDM: CPT | Performed by: EMERGENCY MEDICINE

## 2021-10-30 RX ORDER — ACETAMINOPHEN 325 MG/1
975 TABLET ORAL ONCE
Status: COMPLETED | OUTPATIENT
Start: 2021-10-30 | End: 2021-10-30

## 2021-10-30 RX ORDER — ONDANSETRON 4 MG/1
4 TABLET, ORALLY DISINTEGRATING ORAL ONCE
Status: CANCELLED | OUTPATIENT
Start: 2021-10-30 | End: 2021-10-30

## 2021-10-30 RX ADMIN — ACETAMINOPHEN 975 MG: 325 TABLET, FILM COATED ORAL at 22:02

## (undated) DEVICE — GAUZE SPONGES,16 PLY: Brand: CURITY

## (undated) DEVICE — 3M™ IOBAN™ 2 ANTIMICROBIAL INCISE DRAPE 6650EZ: Brand: IOBAN™ 2

## (undated) DEVICE — CHLORAPREP HI-LITE 26ML ORANGE

## (undated) DEVICE — DRAPE C-ARM X-RAY

## (undated) DEVICE — INTENDED FOR TISSUE SEPARATION, AND OTHER PROCEDURES THAT REQUIRE A SHARP SURGICAL BLADE TO PUNCTURE OR CUT.: Brand: BARD-PARKER SAFETY BLADES SIZE 10, STERILE

## (undated) DEVICE — SPONGE PVP SCRUB WING STERILE

## (undated) DEVICE — PACK MAJOR ORTHO W/SPLITS PBDS

## (undated) DEVICE — ULTRACLEAN ACCESSORY ELECTRODE 1" (2.54 CM) COATED BLADE: Brand: ULTRACLEAN

## (undated) DEVICE — NEEDLE 25G X 1 1/2

## (undated) DEVICE — BLADE ELECTRODE: Brand: EDGE

## (undated) DEVICE — DRAPE CAMERA VIDEO

## (undated) DEVICE — MEDI-VAC YANKAUER SUCTION HANDLE W/STRAIGHT TIP & CONTROL VENT: Brand: CARDINAL HEALTH

## (undated) DEVICE — 2108 SERIES SAGITTAL BLADE (18.6 X 0.64 X 61.1MM)

## (undated) DEVICE — HEAVY DUTY TABLE COVER: Brand: CONVERTORS

## (undated) DEVICE — CAPIT HIP COP -CERAMIC ON POLY

## (undated) DEVICE — SUT MONOCRYL 3-0 PS-2 27 IN Y427H

## (undated) DEVICE — VIOLET BRAIDED (POLYGLACTIN 910), SYNTHETIC ABSORBABLE SUTURE: Brand: COATED VICRYL

## (undated) DEVICE — SUT VICRYL PLUS 2-0 CTB-1 27 IN VCPB259H

## (undated) DEVICE — 3M™ TEGADERM™ TRANSPARENT FILM DRESSING FRAME STYLE, 1628, 6 IN X 8 IN (15 CM X 20 CM), 10/CT 8CT/CASE: Brand: 3M™ TEGADERM™

## (undated) DEVICE — IMPERVIOUS STOCKINETTE: Brand: DEROYAL

## (undated) DEVICE — SUT VICRYL PLUS 1 CTB-1 36 IN VCPB947H

## (undated) DEVICE — PRECISION FALCON 90.0MM OSCILLATING TIP SAW CARTRIDGE: Brand: PRECISION FALCON

## (undated) DEVICE — PROXIMATE SKIN STAPLERS (35 WIDE) CONTAINS 35 STAINLESS STEEL STAPLES (FIXED HEAD): Brand: PROXIMATE

## (undated) DEVICE — HOOD: Brand: FLYTE, SURGICOOL

## (undated) DEVICE — DISPOSABLE EQUIPMENT COVER: Brand: SMALL TOWEL DRAPE

## (undated) DEVICE — ARTHROSCOPY FLOOR MAT

## (undated) DEVICE — TRAY FOLEY 16FR URIMETER SURESTEP

## (undated) DEVICE — SILVER-COATED ANTIMICROBIAL BARRIER DRESSING: Brand: ACTICOAT   4" X 8"

## (undated) DEVICE — SYRINGE 10ML LL

## (undated) DEVICE — THE SIMPULSE SOLO SYSTEM WITH ULTREX RETRACTABLE SPLASH SHIELD TIP: Brand: SIMPULSE SOLO

## (undated) DEVICE — ABDOMINAL PAD: Brand: DERMACEA

## (undated) DEVICE — GLOVE SRG BIOGEL 8

## (undated) DEVICE — SKIN MARKER DUAL TIP WITH RULER CAP, FLEXIBLE RULER AND LABELS: Brand: DEVON